# Patient Record
Sex: MALE | Race: WHITE | NOT HISPANIC OR LATINO | Employment: FULL TIME | URBAN - METROPOLITAN AREA
[De-identification: names, ages, dates, MRNs, and addresses within clinical notes are randomized per-mention and may not be internally consistent; named-entity substitution may affect disease eponyms.]

---

## 2017-07-15 ENCOUNTER — OFFICE VISIT (OUTPATIENT)
Dept: URGENT CARE | Facility: PHYSICIAN GROUP | Age: 52
End: 2017-07-15
Payer: COMMERCIAL

## 2017-07-15 VITALS
WEIGHT: 206 LBS | BODY MASS INDEX: 27.3 KG/M2 | TEMPERATURE: 99.8 F | RESPIRATION RATE: 16 BRPM | HEIGHT: 73 IN | SYSTOLIC BLOOD PRESSURE: 110 MMHG | DIASTOLIC BLOOD PRESSURE: 72 MMHG | OXYGEN SATURATION: 96 % | HEART RATE: 104 BPM

## 2017-07-15 DIAGNOSIS — R50.9 FEVER, UNSPECIFIED FEVER CAUSE: ICD-10-CM

## 2017-07-15 DIAGNOSIS — R11.0 NAUSEA: ICD-10-CM

## 2017-07-15 DIAGNOSIS — T14.8XXA BLISTER: ICD-10-CM

## 2017-07-15 DIAGNOSIS — B34.1 COXSACKIEVIRUS INFECTION: Primary | ICD-10-CM

## 2017-07-15 PROCEDURE — 99214 OFFICE O/P EST MOD 30 MIN: CPT | Performed by: PHYSICIAN ASSISTANT

## 2017-07-15 RX ORDER — ONDANSETRON 4 MG/1
4 TABLET, FILM COATED ORAL EVERY 4 HOURS PRN
Qty: 20 TAB | Refills: 0 | Status: SHIPPED | OUTPATIENT
Start: 2017-07-15 | End: 2017-11-30

## 2017-07-15 RX ORDER — SUMATRIPTAN 25 MG/1
25-100 TABLET, FILM COATED ORAL
COMMUNITY
End: 2017-11-30

## 2017-07-15 RX ORDER — TRIAMCINOLONE ACETONIDE 5 MG/G
OINTMENT TOPICAL
Qty: 30 G | Refills: 0 | Status: SHIPPED | OUTPATIENT
Start: 2017-07-15 | End: 2017-11-30

## 2017-07-15 ASSESSMENT — ENCOUNTER SYMPTOMS
HEADACHES: 1
CARDIOVASCULAR NEGATIVE: 1
MUSCULOSKELETAL NEGATIVE: 1
EYES NEGATIVE: 1
RESPIRATORY NEGATIVE: 1
PSYCHIATRIC NEGATIVE: 1
GASTROINTESTINAL NEGATIVE: 1
FEVER: 1
NEUROLOGICAL NEGATIVE: 1

## 2017-07-15 NOTE — PROGRESS NOTES
Subjective:      Frantz Parker is a 51 y.o. male who presents with Headache            Headache   Associated symptoms include a fever.     Chief Complaint   Patient presents with   • Headache     body aches, fever, blisters on hands X 4 days        HPI:  Frantz Parker is a 51 y.o. male who presents with blisters on palms, headache, body ache, and subjective fevers x 4 days.  Blisters started today.  Taking migraine medicine and not improving the headache.   Also trying the excedrin and iburpofen and sudafed.  HA is everywhere and improved slightly with imitrex.  Started in the frontal region.  Migraines normally happen 2 times a month.      Also feeling fever and tired.  Did have ingrown toe and took bactrim, off for 7 days.Patient denies HA, SOB, chest pain, palpitations, fever, chills, or n/v/d.      Past Medical History   Diagnosis Date   • Snoring    • Unspecified disorder of thyroid    • Sleep apnea      CPAP   • Hypothyroidism    • Obstructive sleep apnea        Past Surgical History   Procedure Laterality Date   • Other  2007     sinus surgery   • Inguinal hernia repair bilateral  6/6/2013     Performed by Mp Horan M.D. at SURGERY [facility]   • Lumbar laminectomy diskectomy  11/8/2013     Performed by Maycol Hicks M.D. at SURGERY [facility]   • Foraminotomy  11/8/2013     Performed by Maycol Hicks M.D. at SURGERY [facility]       No family history on file.    Social History     Social History   • Marital Status:      Spouse Name: N/A   • Number of Children: N/A   • Years of Education: N/A     Occupational History   • Not on file.     Social History Main Topics   • Smoking status: Former Smoker -- 0.50 packs/day for 15 years     Types: Cigarettes     Start date: 01/01/1994     Quit date: 01/01/2009   • Smokeless tobacco: Never Used   • Alcohol Use: No   • Drug Use: No   • Sexual Activity: Not on file     Other Topics Concern   • Not on file  "    Social History Narrative         Current outpatient prescriptions:   •  SUMAtriptan,  mg, Oral, Once PRN  •  rosuvastatin, 5 mg, Oral, DAILY  •  levothyroxine, 150 mcg, Oral, DAILY  •  azithromycin, z-jennifer; U.D., Not taking  •  amoxicillin-clavulanate, 1 Tab, Oral, BID, Not taking  •  guaifenesin-codeine, 5 mL, Oral, Q4HRS PRN, Not taking  •  diazepam, 5 mg, Oral, Q8HRS PRN, Not Taking  •  oxycodone-acetaminophen, 1-2 Tab, Oral, Q4HRS PRN, Not Taking  •  senna-docusate, 2 Tab, Oral, DAILY, Not Taking  •  Diazepam (VALIUM PO), 5 mg, Oral, PRN, Not Taking at Unknown  •  Rosuvastatin Calcium (CRESTOR PO), 5 mg, Oral, Q EVENING, Taking at Unknown  •  polyethylene glycol/lytes, 17 g, Oral, DAILY, Not Taking at Unknown  •  acetaminophen, 650 mg, Oral, Q4HRS PRN, Not Taking at Unknown    No Known Allergies         Review of Systems   Constitutional: Positive for fever.   Eyes: Negative.    Respiratory: Negative.    Cardiovascular: Negative.    Gastrointestinal: Negative.    Genitourinary: Negative.    Musculoskeletal: Negative.    Skin: Positive for rash.        Blisters on hands   Neurological: Negative.    Endo/Heme/Allergies: Negative.    Psychiatric/Behavioral: Negative.           Objective:     /72 mmHg  Pulse 104  Temp(Src) 37.7 °C (99.8 °F)  Resp 16  Ht 1.854 m (6' 1\")  Wt 93.441 kg (206 lb)  BMI 27.18 kg/m2  SpO2 96%     Physical Exam       Nursing note and vital signs reviewed.    Constitutional:  Appropriately groomed, pleasant affect, well nourished, and in no acute distress.    HEENT:  Head: Atraumatic, normocephalic.    Eyes:  EOMs full.  Conjunctivae clear, sclera white, and medial canthus without exudate bilaterally.    Ears:  Hearing grossly intact to voice.    Neck:  FROM.  No anterior cervical chain lymphadenopathy. Thyroid nonpalpable, without masses or nodules. No supraclavicular lymphadenopathy to palpation.    Throat:  Oropharynx slightly erythematous but no lesions present. No " target shaped lesions around the mouth. Dentition within normal limits. No enlargement of the tonsils bilaterally. Uvula midline.     Lungs:  Lungs with normal respiratory excursion and effort.      Muscle skeletal:  Gait and station wnl, non antalgic.    Derm:  Bilateral hands thumb web space: Lesions are superficial with erythema surrounding and clear fluid with a thin viviana and central darkening.    Overall good turgor pressure.     Psychiatric:  Normal judgement, mood and affect.        Assessment/Plan:     1. Fever, unspecified fever cause  ondansetron (ZOFRAN) 4 MG Tab tablet   2. Blister  triamcinolone (ARISTOCORT) 0.5 % ointment    ondansetron (ZOFRAN) 4 MG Tab tablet   3. Nausea  ondansetron (ZOFRAN) 4 MG Tab tablet   4. Coxsackievirus infection        Patient presents for suspected coxsackie virus infection with target lesions on the hands, thumb web spaces bilaterally. No significant palmar lesions, but will full lesions developing in clinic over the top of the hands and fingers. Not dyshidrotic eczema like. Lesions are superficial with erythema surrounding and clear fluid with a thin viviana and central darkening. No oral lesions or plantar lesions. Given URI symptoms with coryza with clear rhinorrhea and slightly erythematous oropharynx, fever, nausea and headache suspect coxsackievirus with likely exposure between July 4 and Saturday of that week. Recommended supportive measures provided patient with work note. Did recommend returning if symptoms change or worsen. Prescribe Zofran for nausea and topical steroid cream for supportive care only. Reviewed with Frantz and his wife etiology of coxsackievirus and expectation on resolution of symptoms to be within 4-10 days, possibly up to 2-3 weeks if rash worsens.    Patient was in agreement with this treatment plan and seemed to understand without barriers. All questions were encouraged and answered.  Reviewed signs and symptoms of when to seek emergency  medical care.     Please note that this dictation was created using voice recognition software.  I have made every reasonable attempt to correct obvious errors, but I expect there are errors of glo and possibly content that I did not discover before finalizing the note.

## 2017-07-15 NOTE — PATIENT INSTRUCTIONS
Hand, Foot, and Mouth Disease  Hand, foot, and mouth disease is an illness caused by a type of germ (virus). Most people are better in 1 week. It can spread easily (contagious). It can be spread through contact with an infected persons:  · Spit (saliva).  · Snot (nasal discharge).  · Poop (stool).  HOME CARE  · Feed your child healthy foods and drinks.  ¨ Avoid salty, spicy, or acidic foods or drinks.  ¨ Offer soft foods and cold drinks.  · Ask your doctor about replacing body fluid loss (rehydration).  · Avoid bottles for younger children if it causes pain. Use a cup, spoon, or syringe.  · Keep your child out of childcare, schools, or other group settings during the first few days of the illness, or until they are without fever.  GET HELP RIGHT AWAY IF:  · Your child has signs of body fluid loss (dehydration):  ¨ Peeing (urinating) less.  ¨ Dry mouth, tongue, or lips.  ¨ Decreased tears or sunken eyes.  ¨ Dry skin.  ¨ Fast breathing.  ¨ Fussy behavior.  ¨ Poor color or pale skin.  ¨ Fingertips take more than 2 seconds to turn pink again after a gentle squeeze.  ¨ Fast weight loss.  · Your child's pain does not get better.  · Your child has a severe headache, stiff neck, or has a change in behavior.  · Your child has sores (ulcers) or blisters on the lips or outside of the mouth.  MAKE SURE YOU:  · Understand these instructions.  · Will watch your child's condition.  · Will get help right away if your child is not doing well or gets worse.     This information is not intended to replace advice given to you by your health care provider. Make sure you discuss any questions you have with your health care provider.     Document Released: 08/30/2012 Document Revised: 03/11/2013 Document Reviewed: 01/25/2016  [employer] Interactive Patient Education ©2016 [employer]vier Inc.

## 2017-07-15 NOTE — Clinical Note
July 15, 2017         Patient: Frantz Parker   YOB: 1965   Date of Visit: 7/15/2017           To Whom it May Concern:    Frantz Parker was seen in my clinic on 7/15/2017. Please excuse him from work 7/17-7/19.  May return sooner if improving.    If you have any questions or concerns, please don't hesitate to call.        Sincerely,           Rivera Perez PA-C  Electronically Signed

## 2017-07-15 NOTE — MR AVS SNAPSHOT
"        Frantz Parker   7/15/2017 10:40 AM   Office Visit   MRN: [id number]    Department:  [facility]   Dept Phone:  [phone]    Description:  Male : 1965   Provider:  Rivera Perez PA-C           Reason for Visit     Headache body aches, fever, blisters on hands X 4 days       Allergies as of 7/15/2017     No Known Allergies      You were diagnosed with     Fever, unspecified fever cause   [1943597]       Blister   [[id number]]       Nausea   [[id number]]       Coxsackievirus infection   [[id number]]         Vital Signs     Blood Pressure Pulse Temperature Respirations Height Weight    110/72 mmHg 104 37.7 °C (99.8 °F) 16 1.854 m (6' 1\") 93.441 kg (206 lb)    Body Mass Index Oxygen Saturation Smoking Status             27.18 kg/m2 96% Former Smoker         Basic Information     Date Of Birth Sex Race Ethnicity Preferred Language    1965 Male White Non- English      Problem List              ICD-10-CM Priority Class Noted - Resolved    Bilateral inguinal hernia with obstruction K40.30   2013 - Present    Thoracic or lumbosacral neuritis or radiculitis, unspecified [id number]   2013 - Present      Health Maintenance        Date Due Completion Dates    IMM DTaP/Tdap/Td Vaccine (1 - Tdap) 1983 ---    COLONOSCOPY 2014 ---    IMM INFLUENZA (1) 2017 10/1/2015            Current Immunizations     Influenza LAIV (Nasal) 10/1/2015      Below and/or attached are the medications your provider expects you to take. Review all of your home medications and newly ordered medications with your provider and/or pharmacist. Follow medication instructions as directed by your provider and/or pharmacist. Please keep your medication list with you and share with your provider. Update the information when medications are discontinued, doses are changed, or new medications (including over-the-counter products) are added; and carry medication information at all times in the event of emergency " situations     Allergies:  No Known Allergies          Medications  Valid as of: July 15, 2017 - 12:09 PM    Generic Name Brand Name Tablet Size Instructions for use    Acetaminophen (Tab) TYLENOL 325 MG Take 650 mg by mouth every four hours as needed.        Amoxicillin-Pot Clavulanate (Tab) AUGMENTIN 875-125 MG Take 1 Tab by mouth 2 times a day.        Azithromycin (Tab) ZITHROMAX 250 MG z-jennifer; U.D.        DiazePAM   Take 5 mg by mouth as needed.        DiazePAM (Tab) VALIUM 5 MG Take 1 Tab by mouth every 8 hours as needed.        Guaifenesin-Codeine (Solution) ROBITUSSIN -10 mg/5mL Take 5 mL by mouth every four hours as needed for Cough.        Levothyroxine Sodium (Tab) SYNTHROID 150 MCG Take 150 mcg by mouth every day.        Ondansetron HCl (Tab) ZOFRAN 4 MG Take 1 Tab by mouth every four hours as needed for Nausea/Vomiting.        Oxycodone-Acetaminophen (Tab) PERCOCET 5-325 MG Take 1-2 Tabs by mouth every four hours as needed ((Pain Scale 4-6)).        Polyethylene Glycol 3350 (Pack) MIRALAX  Take 17 g by mouth every day.        Rosuvastatin Calcium   Take 5 mg by mouth every evening.        Rosuvastatin Calcium (Tab) CRESTOR 5 MG Take 5 mg by mouth every day.        Sennosides-Docusate Sodium (Tab) PERICOLACE or SENOKOT S 8.6-50 MG Take 2 Tabs by mouth every day.        SUMAtriptan Succinate (Tab) IMITREX 25 MG Take  mg by mouth Once PRN for Migraine.        Triamcinolone Acetonide (Ointment) ARISTOCORT 0.5 % Apply small amount to rash BID for 1 week, wash hands after use        .                 Medicines prescribed today were sent to:     [id number] - [place] - [address] [facility] [employer] [address] [facility]    [address] [address]    Phone: [phone] Fax: [phone]    Open 24 Hours?: No      Medication refill instructions:       If your prescription bottle indicates you have medication refills left, it is not necessary to call your provider’s office. Please  contact your pharmacy and they will refill your medication.    If your prescription bottle indicates you do not have any refills left, you may request refills at any time through one of the following ways: The online [employer] technology system (except Urgent Care), by calling your provider’s office, or by asking your pharmacy to contact your provider’s office with a refill request. Medication refills are processed only during regular business hours and may not be available until the next business day. Your provider may request additional information or to have a follow-up visit with you prior to refilling your medication.   *Please Note: Medication refills are assigned a new Rx number when refilled electronically. Your pharmacy may indicate that no refills were authorized even though a new prescription for the same medication is available at the pharmacy. Please request the medicine by name with the pharmacy before contacting your provider for a refill.        Instructions    Hand, Foot, and Mouth Disease  Hand, foot, and mouth disease is an illness caused by a type of germ (virus). Most people are better in 1 week. It can spread easily (contagious). It can be spread through contact with an infected persons:  · Spit (saliva).  · Snot (nasal discharge).  · Poop (stool).  HOME CARE  · Feed your child healthy foods and drinks.  ¨ Avoid salty, spicy, or acidic foods or drinks.  ¨ Offer soft foods and cold drinks.  · Ask your doctor about replacing body fluid loss (rehydration).  · Avoid bottles for younger children if it causes pain. Use a cup, spoon, or syringe.  · Keep your child out of childcare, schools, or other group settings during the first few days of the illness, or until they are without fever.  GET HELP RIGHT AWAY IF:  · Your child has signs of body fluid loss (dehydration):  ¨ Peeing (urinating) less.  ¨ Dry mouth, tongue, or lips.  ¨ Decreased tears or sunken eyes.  ¨ Dry skin.  ¨ Fast breathing.  ¨ Fussy  behavior.  ¨ Poor color or pale skin.  ¨ Fingertips take more than 2 seconds to turn pink again after a gentle squeeze.  ¨ Fast weight loss.  · Your child's pain does not get better.  · Your child has a severe headache, stiff neck, or has a change in behavior.  · Your child has sores (ulcers) or blisters on the lips or outside of the mouth.  MAKE SURE YOU:  · Understand these instructions.  · Will watch your child's condition.  · Will get help right away if your child is not doing well or gets worse.     This information is not intended to replace advice given to you by your health care provider. Make sure you discuss any questions you have with your health care provider.     Document Released: 08/30/2012 Document Revised: 03/11/2013 Document Reviewed: 01/25/2016  [employer] Interactive Patient Education ©2016 Elsevier Inc.              VIOlife Access Code: [email or web address]  Expires: 8/14/2017 12:09 PM    VIOlife  A secure, online tool to manage your health information     [email or web address] VIOlife® is a secure, online tool that connects you to your personalized health information from the privacy of your home -- day or night - making it very easy for you to manage your healthcare. Once the activation process is completed, you can even access your medical information using the VIOlife nisha, which is available for free in the Apple Nisha store or Google Play store.     VIOlife provides the following levels of access (as shown below):   My Chart Features   [facility]  Specialists [facility]  Urgent  Care Non-[facility]  Primary Care  Doctor   Email your healthcare team securely and privately 24/7 X X X    Manage appointments: schedule your next appointment; view details of past/upcoming appointments X      Request prescription refills. X      View recent personal medical records, including lab and immunizations X X X X   View health record, including health history, allergies, medications X X X X   Read  reports about your outpatient visits, procedures, consult and ER notes X X X X   See your discharge summary, which is a recap of your hospital and/or ER visit that includes your diagnosis, lab results, and care plan. X X       How to register for expressor software:  1. Go to  [email or web address].  2. Click on the Sign Up Now box, which takes you to the New Member Sign Up page. You will need to provide the following information:  a. Enter your expressor software Access Code exactly as it appears at the top of this page. (You will not need to use this code after you’ve completed the sign-up process. If you do not sign up before the expiration date, you must request a new code.)   b. Enter your date of birth.   c. Enter your home email address.   d. Click Submit, and follow the next screen’s instructions.  3. Create a expressor software ID. This will be your expressor software login ID and cannot be changed, so think of one that is secure and easy to remember.  4. Create a expressor software password. You can change your password at any time.  5. Enter your Password Reset Question and Answer. This can be used at a later time if you forget your password.   6. Enter your e-mail address. This allows you to receive e-mail notifications when new information is available in expressor software.  7. Click Sign Up. You can now view your health information.    For assistance activating your expressor software account, call [phone]

## 2017-07-27 ENCOUNTER — HOSPITAL ENCOUNTER (OUTPATIENT)
Dept: HOSPITAL 8 - CFH | Age: 52
Discharge: HOME | End: 2017-07-27
Attending: NURSE PRACTITIONER
Payer: COMMERCIAL

## 2017-07-27 DIAGNOSIS — G89.29: ICD-10-CM

## 2017-07-27 DIAGNOSIS — E78.2: ICD-10-CM

## 2017-07-27 DIAGNOSIS — I67.82: Primary | ICD-10-CM

## 2017-07-27 DIAGNOSIS — E03.9: ICD-10-CM

## 2017-07-27 DIAGNOSIS — E55.9: ICD-10-CM

## 2017-07-27 PROCEDURE — 70551 MRI BRAIN STEM W/O DYE: CPT

## 2017-11-30 ENCOUNTER — OCCUPATIONAL MEDICINE (OUTPATIENT)
Dept: URGENT CARE | Facility: PHYSICIAN GROUP | Age: 52
End: 2017-11-30
Payer: COMMERCIAL

## 2017-11-30 VITALS
SYSTOLIC BLOOD PRESSURE: 120 MMHG | WEIGHT: 205 LBS | BODY MASS INDEX: 27.17 KG/M2 | TEMPERATURE: 97.6 F | HEART RATE: 64 BPM | RESPIRATION RATE: 16 BRPM | DIASTOLIC BLOOD PRESSURE: 72 MMHG | HEIGHT: 73 IN | OXYGEN SATURATION: 97 %

## 2017-11-30 DIAGNOSIS — S76.212A STRAIN OF GROIN, LEFT, INITIAL ENCOUNTER: ICD-10-CM

## 2017-11-30 PROCEDURE — 99213 OFFICE O/P EST LOW 20 MIN: CPT | Mod: 29 | Performed by: PHYSICIAN ASSISTANT

## 2017-11-30 ASSESSMENT — ENCOUNTER SYMPTOMS
CONSTITUTIONAL NEGATIVE: 1
ROS GI COMMENTS: SEE HPI
BRUISES/BLEEDS EASILY: 0
NEUROLOGICAL NEGATIVE: 1

## 2017-11-30 NOTE — PROGRESS NOTES
Subjective:      Frantz Parker is a 52 y.o. male who presents with Hernia (groin area, WC)      DOI: 11/28/17, 1730.  Left groin area.  Patient was pulling out some old pipe, down in trench, causing him to be in a straddled position.  Patient states he pulling up to break the pipes loose and felt a moment of pull/strain in his left groin.  Patient states he felt the pain throughout the day yesterday and the day of injury.    He states he a little bit of pain in the testicular/perineum region.  He has not noticed a physical lump or bulges anywhere. No changes in bowels, no nausea or vomiting.   Patient has hx of bilateral inguinal hernia repair approx 4 years ago without continued pain or issue.  He has not attempted any interventions.   He notes the pain is getting better today.      HPI   As above.     Review of Systems   Constitutional: Negative.    Gastrointestinal:        SEE HPI   Musculoskeletal:        SEE HPI   Skin: Negative.    Neurological: Negative.    Endo/Heme/Allergies: Does not bruise/bleed easily.       PMH:  has a past medical history of Hypothyroidism; Obstructive sleep apnea; Sleep apnea; Snoring; and Unspecified disorder of thyroid.  MEDS:   Current Outpatient Prescriptions:   •  rosuvastatin (CRESTOR) 5 MG Tab, Take 5 mg by mouth every day., Disp: , Rfl:   •  levothyroxine (SYNTHROID) 150 MCG TABS, Take 150 mcg by mouth every day., Disp: , Rfl:   ALLERGIES: No Known Allergies  SURGHX:   Past Surgical History:   Procedure Laterality Date   • LUMBAR LAMINECTOMY DISKECTOMY  11/8/2013    Performed by Maycol Hicks M.D. at SURGERY [facility]   • FORAMINOTOMY  11/8/2013    Performed by Maycol Hicks M.D. at SURGERY [facility]   • INGUINAL HERNIA REPAIR BILATERAL  6/6/2013    Performed by Mp Horan M.D. at SURGERY [facility]   • OTHER  2007    sinus surgery     SOCHX:  reports that he quit smoking about 8 years ago. His smoking use included Cigarettes. He  "started smoking about 23 years ago. He has a 7.50 pack-year smoking history. He has never used smokeless tobacco. He reports that he does not drink alcohol or use drugs.  FH: Family history was reviewed, no pertinent findings to report     Objective:     /72   Pulse 64   Temp 36.4 °C (97.6 °F)   Resp 16   Ht 1.854 m (6' 1\")   Wt 93 kg (205 lb)   SpO2 97%   BMI 27.05 kg/m²      Physical Exam   Constitutional: He is oriented to person, place, and time. He appears well-developed and well-nourished. No distress.   Cardiovascular: Normal rate and regular rhythm.    Pulmonary/Chest: Effort normal and breath sounds normal.   Neurological: He is alert and oriented to person, place, and time.   Skin: Skin is warm and dry.   Psychiatric: He has a normal mood and affect. His behavior is normal.     Vitals reviewed  Groin/Abdominal exam:  Abdomen: Soft, nontender, nondistended. Normal bowel sounds. No hepatosplenomegaly or masses, or hernias. No rebound or guarding.  Groin: No testicular swelling or erythema.  No masses or tenderness to palpation.  There is no palpation or detection of direct or indirect inguinal hernia.  No inguinal adenopathy.   Left hip:  FROM without pain including adduction with resistance.         Assessment/Plan:     1. Strain of groin, left, initial encounter         Patient with benign abdominal and groin exam today.  Mild groin tenderness near adductor tendon insertion, no hernias detected.  Consistent with groin strain  Recommend Ibuprofen 600 mg up to TID prn. With food.   Restrictions from heavy lifting as above, 4 day follow up for recheck.          Shyla Anaya P.A.-C.      "

## 2017-11-30 NOTE — LETTER
"EMPLOYEE’S CLAIM FOR COMPENSATION/ REPORT OF INITIAL TREATMENT  FORM C-4    EMPLOYEE’S CLAIM - PROVIDE ALL INFORMATION REQUESTED   First Name  Frantz Last Name  Keith Birthdate                    1965                Sex  male Claim Number   Home Address  [address] Age  52 y.o. Height  1.854 m (6' 1\") Weight  93 kg (205 lb) [facility]     [facility] Zip  [address] Telephone  [phone] (home)    Mailing Address  [address] [facility] Zip  [address] Primary Language Spoken  English    Insurer   Third Party   Troy Hull   Employee's Occupation (Job Title) When Injury or Occupational Disease Occurred      Employer's Name  [employer]  Telephone  [phone]    Employer Address  [address]  [address]    Date of Injury  11/28/2017               Hour of Injury  8:30 AM Date Employer Notified  11/28/2017 Last Day of Work after Injury or Occupational Disease  11/30/2017 Supervisor to Whom Injury Reported  Rajesh Sahni   Address or Location of Accident (if applicable)  [[address] [place] [address].]   What were you doing at the time of accident? (if applicable)  Plumping    How did this injury or occupational disease occur? (Be specific an answer in detail. Use additional sheet if necessary)  Trying to breake loose some old pipes   If you believe that you have an occupational disease, when did you first have knowledge of the disability and it relationship to your employment?  no Witnesses to the Accident  Danny      Nature of Injury or Occupational Disease  Strain  Part(s) of Body Injured or Affected  Abdomen Including Groin, ,     I certify that the above is true and correct to the best of my knowledge and that I have provided this information in order to obtain the benefits of [place] " [place] or [place]).  I hereby authorize any physician, chiropractor, surgeon, practitioner, or other person, any hospital, including [facility], any medical service organization, any insurance company, or other institution or organization to release to each other, any medical or other information, including benefits paid or payable, pertinent to this injury or disease, except information relative to diagnosis, treatment and/or counseling for AIDS, psychological conditions, alcohol or controlled substances, for which I must give specific authorization.  A Photostat of this authorization shall be as valid as the original.     Date   Place   Employee’s Signature   THIS REPORT MUST BE COMPLETED AND MAILED WITHIN 3 WORKING DAYS OF TREATMENT   Place  [facility]  Name of Facility  [place]   Date  11/30/2017 Diagnosis  (S76.212A) Strain of groin, left, initial encounter Is there evidence the injured employee was under the influence of alcohol and/or another controlled substance at the time of accident?   Hour  9:38 AM Description of Injury or Disease  The encounter diagnosis was Strain of groin, left, initial encounter. No   Treatment  Patient with benign abdomina and groin exam today.  Mild groin tenderness near adductor tendon insertion, no hernias detected.  Consistent with groin strain  Recommend Ibuprofen 600 mg up to TID prn. With food.   Restrictions from heavy lifting as above, 4 day follow up for recheck.      Have you advised the patient to remain off work five days or more? No   X-Ray Findings      If Yes   From Date  To Date      From information given by the employee, together with medical evidence, can you directly connect this injury or occupational disease as job incurred?  Yes If No Full Duty  No Modified Duty  Yes   Is additional medical care by a physician indicated?  Yes If Modified Duty, Specify any Limitations  "/ Restrictions  SEE D-39   Do you know of any previous injury or disease contributing to this condition or occupational disease?                            Yes  Comments:Patient has had inguinal hernia repair in the past, no evidence of recurrence of hernia however.    Date  11/30/2017 Print Doctor’s Name Alexis Anaya P.A.-C. I certify the employer’s copy of  this form was mailed on:   Address  [address] [place] [address]. [address] Insurer’s Use Only     [facility] Zip  [address]    Provider’s Tax ID Number  [id number] Telephone  Dept: [phone]        e-ALEXIS Santiago P.A.-C.   e-Signature: Dr. Francisco J Taylor, Medical Director Degree  LG        ORIGINAL-TREATING PHYSICIAN OR CHIROPRACTOR    PAGE 2-INSURER/TPA    PAGE 3-EMPLOYER    PAGE 4-EMPLOYEE             Form C-4 (rev10/07)              BRIEF DESCRIPTION OF RIGHTS AND BENEFITS  (Pursuant to [place] 616C.050)    Notice of Injury or Occupational Disease (Incident Report Form C-1): If an injury or occupational disease (OD) arises out of and in the  course of employment, you must provide written notice to your employer as soon as practicable, but no later than 7 days after the accident or  OD. Your employer shall maintain a sufficient supply of the required forms.    Claim for Compensation (Form C-4): If medical treatment is sought, the form C-4 is available at the place of initial treatment. A completed  \"Claim for Compensation\" (Form C-4) must be filed within 90 days after an accident or OD. The treating physician or chiropractor must,  within 3 working days after treatment, complete and mail to the employer, the employer's insurer and third-party , the Claim for  Compensation.    Medical Treatment: If you require medical treatment for your on-the-job injury or OD, you may be required to select a physician or  chiropractor from a list provided by your workers’ compensation insurer, if it has contracted with an Organization for " Managed Care (MCO) or  Preferred Provider Organization (PPO) or providers of health care. If your employer has not entered into a contract with an MCO or PPO, you  may select a physician or chiropractor from the Panel of Physicians and Chiropractors. Any medical costs related to your industrial injury or  OD will be paid by your insurer.    Temporary Total Disability (TTD): If your doctor has certified that you are unable to work for a period of at least 5 consecutive days, or 5  cumulative days in a 20-day period, or places restrictions on you that your employer does not accommodate, you may be entitled to TTD  compensation.    Temporary Partial Disability (TPD): If the wage you receive upon reemployment is less than the compensation for TTD to which you are  entitled, the insurer may be required to pay you TPD compensation to make up the difference. TPD can only be paid for a maximum of 24  months.    Permanent Partial Disability (PPD): When your medical condition is stable and there is an indication of a PPD as a result of your injury or  OD, within 30 days, your insurer must arrange for an evaluation by a rating physician or chiropractor to determine the degree of your PPD. The  amount of your PPD award depends on the date of injury, the results of the PPD evaluation and your age and wage.    Permanent Total Disability (PTD): If you are medically certified by a treating physician or chiropractor as permanently and totally disabled  and have been granted a PTD status by your insurer, you are entitled to receive monthly benefits not to exceed 66 2/3% of your average  monthly wage. The amount of your PTD payments is subject to reduction if you previously received a PPD award.    Vocational Rehabilitation Services: You may be eligible for vocational rehabilitation services if you are unable to return to the job due to a  permanent physical impairment or permanent restrictions as a result of your injury or  occupational disease.    Transportation and Per Aj Reimbursement: You may be eligible for travel expenses and per aj associated with medical treatment.    Reopening: You may be able to reopen your claim if your condition worsens after claim closure.    Appeal Process: If you disagree with a written determination issued by the insurer or the insurer does not respond to your request, you may  appeal to the Department of Administration, , by following the instructions contained in your determination letter. You must  appeal the determination within 70 days from the date of the determination letter at [address], [address], [place], [place]  [address], or [address], [address], [place], [place] [address]. If you disagree with the  decision, you may appeal to the  Department of Administration, . You must file your appeal within 30 days from the date of the  decision  letter at [address], [address], [place], [place] [address], or [address], [place], [place] [address]. If you  disagree with a decision of an , you may file a petition for judicial review with the District Court. You must do so within 30  days of the Appeal Officer’s decision. You may be represented by an  at your own expense or you may contact the [facility] for possible  representation.    [place]  for Injured Workers (NAIW): If you disagree with a  decision, you may request that NAIW represent you  without charge at an  Hearing. For information regarding denial of benefits, you may contact the [facility] at: [address], [address] [address], [phone], or [address] [facility] [address], [place] [address], [phone]    To File a Complaint with the Division: If you wish to file a complaint with the  of the Division of Industrial Relations  (DIR),  please contact the Workers’ Compensation Section, [address], [address], [place], [place] [address], telephone (390) [phone], or  [address], [address], [place], [place] [address], telephone [phone].    For assistance with Workers’ Compensation Issues: you may contact the Office of the [facility] Consumer Health Assistance, [address] [place] [address], [address], [place], [place] [address], Toll Free 1-584.952.1515, Web site: http://govcha.[facility].nv., E-mail  [email or web address].[email or web address].                                                                                                                                                                                                                                   __________________________________________________________________                                                                   _________________                Employee Name / Signature                                                                                                                                                       Date                                                                                                                                                                                                     D-2 (rev. 10/07)

## 2017-11-30 NOTE — LETTER
[facility]  [address] [place] [address]. [address] - [place] [address]  Phone:  [phone] - Fax:  [phone]   [facility] Progress Report and Disability Certification  Date of Service: 11/30/2017   No Show:  No  Date / Time of Next Visit: 12/4/2017 at 4:30PM [facility]   Claim Information   Patient Name: Frantz Parker  Claim Number:     Employer: [employer]  Date of Injury: 11/28/2017     Insurer / TPA: Troy Hull  ID / SSN:     Occupation:   Diagnosis: The encounter diagnosis was Strain of groin, left, initial encounter.    Medical Information   Related to Industrial Injury? Yes    Subjective Complaints:  DOI: 11/28/17, 4847.  Left groin area.  Patient was pulling out some old pipe, down in trench, causing him to be in a straddled position.  Patient states he pulling up to break the pipes loose and felt a moment of pull/strain in his left groin.  Patient states he felt the pain throughout the day yesterday and the day of injury.    He states he a little bit of pain in the testicular/perineum region.  He has not noticed a physical lump or bulges anywhere. No changes in bowels, no nausea or vomiting.   Patient has hx of bilateral inguinal hernia repair approx 4 years ago without continued pain or issue.  He has not attempted any interventions.   He notes the pain is getting better today.    Objective Findings: Vitals reviewed  Groin/Abdominal exam:  Abdomen: Soft, nontender, nondistended. Normal bowel sounds. No hepatosplenomegaly or masses, or hernias. No rebound or guarding.  Groin: No testicular swelling or erythema.  No masses or tenderness to palpation.  There is no palpation or detection of direct or indirect inguinal hernia.  No inguinal adenopathy.   Left hip:  FROM without pain including adduction with resistance.     Pre-Existing Condition(s): Bilateral inguinal hernia, requiring surgery approx 4 years ago.    Assessment:    Initial Visit    Status: Additional Care Required  Permanent Disability:No    Plan:      Diagnostics:      Comments:       Disability Information   Status: Released to Restricted Duty    From:  11/30/2017  Through: 12/4/2017 Restrictions are: Temporary   Physical Restrictions   Sitting:    Standing:    Stooping:    Bending:      Squatting:    Walking:    Climbing:    Pushing:      Pulling:    Other:    Reaching Above Shoulder (L):   Reaching Above Shoulder (R):       Reaching Below Shoulder (L):    Reaching Below Shoulder (R):      Not to exceed Weight Limits   Carrying(hrs):   Weight Limit(lb): < or = to 25 pounds Lifting(hrs):   Weight  Limit(lb): < or = to 25 pounds   Comments: Patient with benign abdomina and groin exam today.  Mild groin tenderness near adductor tendon insertion, no hernias detected.  Consistent with groin strain  Recommend Ibuprofen 600 mg up to TID prn. With food.   Restrictions from heavy lifting as above, 4 day follow up for recheck.        Repetitive Actions   Hands: i.e. Fine Manipulations from Grasping:     Feet: i.e. Operating Foot Controls:     Driving / Operate Machinery:     Physician Name: Alexis Anaya P.A.-C. Physician Signature: ALEXIS Murrieta P.A.-C. e-Signature: Dr. Francisco J Taylor, Medical Director   Clinic Name / Location: [address]  [place] [address] Clinic Phone Number: Dept: [phone]   Appointment Time: 9:10 Am Visit Start Time: 9:38 AM   Check-In Time:  9:24 Am Visit Discharge Time:  10:20pm   Original-Treating Physician or Chiropractor    Page 2-Insurer/TPA    Page 3-Employer    Page 4-Employee

## 2017-12-04 ENCOUNTER — OCCUPATIONAL MEDICINE (OUTPATIENT)
Dept: URGENT CARE | Facility: PHYSICIAN GROUP | Age: 52
End: 2017-12-04
Payer: COMMERCIAL

## 2017-12-04 VITALS
SYSTOLIC BLOOD PRESSURE: 122 MMHG | HEART RATE: 66 BPM | TEMPERATURE: 98.9 F | HEIGHT: 73 IN | WEIGHT: 205 LBS | BODY MASS INDEX: 27.17 KG/M2 | OXYGEN SATURATION: 99 % | RESPIRATION RATE: 14 BRPM | DIASTOLIC BLOOD PRESSURE: 72 MMHG

## 2017-12-04 DIAGNOSIS — S76.212D STRAIN OF GROIN, LEFT, SUBSEQUENT ENCOUNTER: ICD-10-CM

## 2017-12-04 PROCEDURE — 99213 OFFICE O/P EST LOW 20 MIN: CPT | Mod: 29 | Performed by: NURSE PRACTITIONER

## 2017-12-04 ASSESSMENT — ENCOUNTER SYMPTOMS
ABDOMINAL PAIN: 0
VOMITING: 0
FEVER: 0
MYALGIAS: 0
BRUISES/BLEEDS EASILY: 0
WEAKNESS: 0
CHILLS: 0
NAUSEA: 0

## 2017-12-04 NOTE — LETTER
[facility]  [address] [place] [address]. [address] - [place] [address]  Phone:  [phone] - Fax:  [phone]   [facility] Progress Report and Disability Certification  Date of Service: 12/4/2017   No Show:  No  Date / Time of Next Visit: 12/8/2017 @ 9:30AM   Claim Information   Patient Name: Frantz Parker  Claim Number:     Employer: [employer]  Date of Injury: 11/28/2017     Insurer / TPA: Troy Hull  ID / SSN:     Occupation:   Diagnosis: The encounter diagnosis was Strain of groin, left, subsequent encounter.    Medical Information   Related to Industrial Injury? Yes    Subjective Complaints:  DOI 11/28/17. 2nd encounter. Denies pain in left groin area, states occasional dull ache. Denies swelling, redness or lumps felt or seen. Denies problems, dysuria or hematuria on urination. No starining to have bowel movements. Taking Ibuprofen 600 mg yesterday and today on recommendation but not for pain.    Objective Findings: A/O x 4. No TTP on abdomen/left groin region, no abnormal gait due to discomfort.    Pre-Existing Condition(s):     Assessment:   Condition Improved    Status: Additional Care Required  Permanent Disability:No    Plan:      Diagnostics:      Comments:       Disability Information   Status: Released to Full Duty    From:  12/4/2017  Through: 12/8/2017 Restrictions are:     Physical Restrictions   Sitting:    Standing:    Stooping:    Bending:      Squatting:    Walking:    Climbing:    Pushing:      Pulling:    Other:    Reaching Above Shoulder (L):   Reaching Above Shoulder (R):       Reaching Below Shoulder (L):    Reaching Below Shoulder (R):      Not to exceed Weight Limits   Carrying(hrs):   Weight Limit(lb):   Lifting(hrs):   Weight  Limit(lb):     Comments: Full duty with no restriction trial x 4 days. Possible discharge/MMI next visit. May continue Ibuprofen as needed for pain/discomfort. Discontinue lifting > 25  pounds if invokes pain/discomfort and recheck before next visit as needed.    Repetitive Actions   Hands: i.e. Fine Manipulations from Grasping:     Feet: i.e. Operating Foot Controls:     Driving / Operate Machinery:     Physician Name: ALEXANDREA Carias Physician Signature: EMMANUEL Nye e-Signature: Dr. Francisco J Taylor, Medical Director   Clinic Name / Location: [address]  [place] [address] Clinic Phone Number: Dept: [phone]   Appointment Time: 4:30 Pm Visit Start Time: 4:09 PM   Check-In Time:  4:02 Pm Visit Discharge Time:  4:58 PM   Original-Treating Physician or Chiropractor    Page 2-Insurer/TPA    Page 3-Employer    Page 4-Employee

## 2017-12-05 NOTE — PROGRESS NOTES
Subjective:      Frantz Parker is a 52 y.o. male who presents with Follow-Up (Workman's comp Groin Strain (left) getting better. )      DOI 11/28/17. 2nd encounter. Denies pain in left groin area, states occasional dull ache. Denies swelling, redness or lumps felt or seen. Denies problems, dysuria or hematuria on urination. No starining to have bowel movements. Taking Ibuprofen 600 mg yesterday and today on recommendation but not for pain.      HPI  See above  PMH:  has a past medical history of Hypothyroidism; Obstructive sleep apnea; Sleep apnea; Snoring; and Unspecified disorder of thyroid.  MEDS:   Current Outpatient Prescriptions:   •  rosuvastatin (CRESTOR) 5 MG Tab, Take 5 mg by mouth every day., Disp: , Rfl:   •  levothyroxine (SYNTHROID) 150 MCG TABS, Take 150 mcg by mouth every day., Disp: , Rfl:   ALLERGIES: No Known Allergies  SURGHX:   Past Surgical History:   Procedure Laterality Date   • LUMBAR LAMINECTOMY DISKECTOMY  11/8/2013    Performed by Maycol Hicks M.D. at SURGERY [facility]   • FORAMINOTOMY  11/8/2013    Performed by Maycol Hicks M.D. at SURGERY [facility]   • INGUINAL HERNIA REPAIR BILATERAL  6/6/2013    Performed by Mp Horan M.D. at SURGERY [facility]   • OTHER  2007    sinus surgery     SOCHX:  reports that he quit smoking about 8 years ago. His smoking use included Cigarettes. He started smoking about 23 years ago. He has a 7.50 pack-year smoking history. He has never used smokeless tobacco. He reports that he does not drink alcohol or use drugs.  FH: Family history was reviewed, no pertinent findings to report    Review of Systems   Constitutional: Negative for chills, fever and malaise/fatigue.   Gastrointestinal: Negative for abdominal pain, nausea and vomiting.   Genitourinary: Negative for dysuria, frequency, hematuria and urgency.   Musculoskeletal: Negative for myalgias.   Neurological: Negative for weakness.   Endo/Heme/Allergies:  "Does not bruise/bleed easily.   All other systems reviewed and are negative.         Objective:     /72   Pulse 66   Temp 37.2 °C (98.9 °F)   Resp 14   Ht 1.854 m (6' 1\")   Wt 93 kg (205 lb)   SpO2 99%   BMI 27.05 kg/m²      Physical Exam   Constitutional: He is oriented to person, place, and time. He appears well-developed and well-nourished. No distress.   HENT:   Head: Normocephalic.   Eyes: Conjunctivae and EOM are normal. Pupils are equal, round, and reactive to light.   Cardiovascular: Normal rate.    Pulmonary/Chest: Effort normal.   Abdominal: Soft. Bowel sounds are normal. He exhibits no distension. There is no tenderness. There is no rebound and no guarding.   Musculoskeletal: Normal range of motion.   Neurological: He is alert and oriented to person, place, and time.   Skin: Skin is warm and dry. He is not diaphoretic.   Vitals reviewed.      A/O x 4. No TTP on abdomen/left groin region, no abnormal gait due to discomfort.        Assessment/Plan:     1. Strain of groin, left, subsequent encounter    Follow up in 4 days after return for full duties without restrictions trial, possible discharge/MMI at next visit      "

## 2017-12-08 ENCOUNTER — OCCUPATIONAL MEDICINE (OUTPATIENT)
Dept: URGENT CARE | Facility: PHYSICIAN GROUP | Age: 52
End: 2017-12-08
Payer: COMMERCIAL

## 2017-12-08 VITALS
RESPIRATION RATE: 16 BRPM | WEIGHT: 204 LBS | HEIGHT: 73 IN | HEART RATE: 85 BPM | BODY MASS INDEX: 27.04 KG/M2 | DIASTOLIC BLOOD PRESSURE: 72 MMHG | OXYGEN SATURATION: 94 % | TEMPERATURE: 99.7 F | SYSTOLIC BLOOD PRESSURE: 108 MMHG

## 2017-12-08 DIAGNOSIS — S76.212D STRAIN OF GROIN, LEFT, SUBSEQUENT ENCOUNTER: ICD-10-CM

## 2017-12-08 PROCEDURE — 99214 OFFICE O/P EST MOD 30 MIN: CPT | Mod: 29 | Performed by: PHYSICIAN ASSISTANT

## 2017-12-08 ASSESSMENT — ENCOUNTER SYMPTOMS: CONSTIPATION: 0

## 2017-12-08 NOTE — PROGRESS NOTES
"Subjective:      Frantz Parker is a 52 y.o. male who presents with Follow-Up (WC F/V DOI11/28/2017 LT groin.  PT states it feels a little better, but still has some pressure/discomfort.  Standing/walking no issues, feels like it pinches when he sits down.)    Pt PMH, SocHx, SurgHx, FamHx, Drug allergies and medications reviewed with pt/EPIC.      Family history reviewed, it is not pertinent to this complaint.     DOI: 11/28/2017.  PT here for follow up of groin strain/pull while at work.  Pt states he still feels pressure and sometimes pinching in left lower abdominal wall that sometime radiates into his left testicle.  PT does not characterize this as pain, but states he is \"aware of it\".  PT denies changes in bowel or bladder habits.       Work comp follow up for groin injury.         Review of Systems   Gastrointestinal: Negative for constipation.   Genitourinary: Negative.    All other systems reviewed and are negative.         Objective:     /72   Pulse 85   Temp 37.6 °C (99.7 °F)   Resp 16   Ht 1.854 m (6' 1\")   Wt 92.5 kg (204 lb)   SpO2 94%   BMI 26.91 kg/m²      Physical Exam   Constitutional: He is oriented to person, place, and time. He appears well-developed and well-nourished. No distress.   HENT:   Head: Normocephalic and atraumatic.   Nose: Nose normal.   Mouth/Throat: Oropharynx is clear and moist.   Eyes: Conjunctivae and EOM are normal. Pupils are equal, round, and reactive to light.   Neck: Normal range of motion. Neck supple. No JVD present.   Cardiovascular: Normal rate, regular rhythm and normal heart sounds.    Pulmonary/Chest: Effort normal and breath sounds normal.   Abdominal: Soft.       This is where pt perceives pressure and pinching.  No bulge or obvious abdominal wall abnormality.    Musculoskeletal: Normal range of motion.   Lymphadenopathy:     He has no cervical adenopathy.   Neurological: He is alert and oriented to person, place, and time.   Skin: Skin is warm " and dry. Capillary refill takes less than 2 seconds.       PT denies pain on palpation, but states he feels pressure.  Pt feels same pressure with flexion at left hip.  No bulge or deformity to abdominal wall on palpation.         Assessment/Plan:     1. Strain of groin, left, subsequent encounter       PT to continue work without restrictions at his request.  He wants to monitor his symptoms a little while longer before being released.  He is unsure if it is simply healing or if it is getting worse because he was taken off restrictions.      PT will follow up at [facility] for his next appointment.     PT reminded to be cautious with lifting, squatting, or carrying heavy objects.

## 2017-12-08 NOTE — LETTER
"   [facility]  [address] [place] [address]. [address] - [place] [address]  Phone:  [phone] - Fax:  [phone]   [facility] Progress Report and Disability Certification  Date of Service: 12/8/2017   No Show:  No  Date / Time of Next Visit: 12/13/2017@1:05 PM   Claim Information   Patient Name: Frantz Parker  Claim Number:     Employer: [employer]  Date of Injury: 11/28/2017     Insurer / TPA: Troy Hull  ID / SSN:     Occupation:   Diagnosis: The encounter diagnosis was Strain of groin, left, subsequent encounter.    Medical Information   Related to Industrial Injury? Yes    Subjective Complaints:  DOI: 11/28/2017.  PT here for follow up of groin strain/pull while at work.  Pt states he still feels pressure and sometimes pinching in left lower abdominal wall that sometime radiates into his left testicle.  PT does not characterize this as pain, but states he is \"aware of it\".  PT denies changes in bowel or bladder habits.     Objective Findings: PT denies pain on palpation, but states he feels pressure.  Pt feels same pressure with flexion at left hip.  No bulge or deformity to abdominal wall on palpation.     Pre-Existing Condition(s):     Assessment:   Condition Same    Status: Additional Care Required  Permanent Disability:No    Plan:      Diagnostics:      Comments:       Disability Information   Status: Released to Full Duty    From:  12/8/2017  Through: 12/13/2017 Restrictions are:     Physical Restrictions   Sitting:    Standing:    Stooping:    Bending:      Squatting:    Walking:    Climbing:    Pushing:      Pulling:    Other:    Reaching Above Shoulder (L):   Reaching Above Shoulder (R):       Reaching Below Shoulder (L):    Reaching Below Shoulder (R):      Not to exceed Weight Limits   Carrying(hrs):   Weight Limit(lb):   Lifting(hrs):   Weight  Limit(lb):     Comments: PT would like to continue without restrictions to see if " symptoms remain stable or worsen.  Pt will follow up with [facility] for next visit.     Repetitive Actions   Hands: i.e. Fine Manipulations from Grasping:     Feet: i.e. Operating Foot Controls:     Driving / Operate Machinery:     Physician Name: Kathrin Wilson P.A.-C. Physician Signature: KATHRIN Rankin P.A.-C. e-Signature: Dr. Francisco J Taylor, Medical Director   Clinic Name / Location: [address]  [place] [address] Clinic Phone Number: Dept: [phone]   Appointment Time: 9:30 Am Visit Start Time: 9:22 AM   Check-In Time:  9:18 Am Visit Discharge Time: 10:05 AM   Original-Treating Physician or Chiropractor    Page 2-Insurer/TPA    Page 3-Employer    Page 4-Employee

## 2017-12-13 ENCOUNTER — OCCUPATIONAL MEDICINE (OUTPATIENT)
Dept: OCCUPATIONAL MEDICINE | Facility: CLINIC | Age: 52
End: 2017-12-13
Payer: COMMERCIAL

## 2017-12-13 VITALS
OXYGEN SATURATION: 97 % | SYSTOLIC BLOOD PRESSURE: 114 MMHG | HEART RATE: 91 BPM | TEMPERATURE: 98.8 F | DIASTOLIC BLOOD PRESSURE: 64 MMHG | WEIGHT: 205 LBS | HEIGHT: 73 IN | RESPIRATION RATE: 16 BRPM | BODY MASS INDEX: 27.17 KG/M2

## 2017-12-13 DIAGNOSIS — S76.212A STRAIN OF GROIN, LEFT, INITIAL ENCOUNTER: ICD-10-CM

## 2017-12-13 PROCEDURE — 99203 OFFICE O/P NEW LOW 30 MIN: CPT | Performed by: PREVENTIVE MEDICINE

## 2017-12-13 ASSESSMENT — PAIN SCALES - GENERAL: PAINLEVEL: NO PAIN

## 2017-12-13 NOTE — LETTER
[address],   [address] [place] [address]  Phone:  [phone] - Fax:  [phone]   [facility] Progress Report and Disability Certification  Date of Service: 12/13/2017   No Show:  No  Date / Time of Next Visit: 1/2/2018@3:30PM    Claim Information   Patient Name: Frantz Parker  Claim Number:     Employer: [employer]  Date of Injury: 11/28/2017     Insurer / TPA: Troy Hull  ID / SSN:     Occupation:   Diagnosis: The encounter diagnosis was Strain of groin, left, initial encounter.    Medical Information   Related to Industrial Injury?   Comments:indeterminate    Subjective Complaints:  DOI 11/28/17: 52 year old male presents for left groin strain. Patient states he pulling up to break the pipes loose and felt a pull/strain in his left groin. Seen in [facility], noted some improvement and was put back on full duty. Patient states that overall the pain is about the same. He has occasional pain in the left inguinal area. Pain does not radiate, denies any abdominal pain, nausea or vomiting. Pain sometimes worse with lifting. He states it feels just like his previous hernia.   Objective Findings: Abdomen/: No visible bulging. Scrotal/teste grossly normal. Mild tenderness along the left inguinal canal. No hernia felt on exam.    Pre-Existing Condition(s): Bilateral inguinal hernia repair 4 years ago   Assessment:   Condition Same    Status: Additional Care Required  Permanent Disability:No    Plan:      Diagnostics:      Comments:  Referral for ultrasound to evaluate for hernia  Continue ibuprofen as needed   Okay for full duty  Follow-up 3 weeks, sooner if ultrasound performed sooner    Disability Information   Status: Released to Full Duty    From:  12/13/2017  Through: 1/2/2018 Restrictions are:     Physical Restrictions   Sitting:    Standing:    Stooping:    Bending:      Squatting:    Walking:    Climbing:   Pushing:      Pulling:    Other:    Reaching Above Shoulder (L):   Reaching Above Shoulder (R):       Reaching Below Shoulder (L):    Reaching Below Shoulder (R):      Not to exceed Weight Limits   Carrying(hrs):   Weight Limit(lb):   Lifting(hrs):   Weight  Limit(lb):     Comments:      Repetitive Actions   Hands: i.e. Fine Manipulations from Grasping:     Feet: i.e. Operating Foot Controls:     Driving / Operate Machinery:     Physician Name: Florencio Mcclendon D.O. Physician Signature: FLORENCIO Mora D.O. e-Signature: Dr. Francisco J Taylor, Medical Director   Clinic Name / Location: [address],   [address]  [place] [address] Clinic Phone Number: Dept: [phone]   Appointment Time: 1:15 Pm Visit Start Time: 12:57 PM   Check-In Time:  12:52 Pm Visit Discharge Time: 1:12PM    Original-Treating Physician or Chiropractor    Page 2-Insurer/TPA    Page 3-Employer    Page 4-Employee

## 2017-12-13 NOTE — PROGRESS NOTES
"Subjective:      Frantz Parker is a 52 y.o. male who presents with Follow-Up ([facility] DOI 11/28/2017 - Groin - Same - Pro Room 1)      DOI 11/28/17: 52 year old male presents for left groin strain. Patient states he pulling up to break the pipes loose and felt a pull/strain in his left groin. Seen in [facility], noted some improvement and was put back on full duty. Patient states that overall the pain is about the same. He has occasional pain in the left inguinal area. Pain does not radiate, denies any abdominal pain, nausea or vomiting. Pain sometimes worse with lifting. He states it feels just like his previous hernia.     HPI    ROS  ROS: All systems were reviewed on intake form, form was reviewed and signed. See scanned documents in media. Pertinent positives and negatives included in HPI.    PMH: Bilateral inguinal hernia repair 4 years ago  MEDS: Medications were reviewed in Epic  ALLERGIES: No Known Allergies  SOCHX: Works as a  at [employer]   FH: No pertinent family history to this problem     Objective:     /64   Pulse 91   Temp 37.1 °C (98.8 °F)   Resp 16   Ht 1.854 m (6' 1\")   Wt 93 kg (205 lb)   SpO2 97%   BMI 27.05 kg/m²      Physical Exam   Constitutional: He is oriented to person, place, and time. He appears well-developed and well-nourished.   HENT:   Right Ear: External ear normal.   Left Ear: External ear normal.   Eyes: Conjunctivae and EOM are normal.   Cardiovascular: Normal rate.    Pulmonary/Chest: Effort normal. No respiratory distress.   Neurological: He is alert and oriented to person, place, and time.   Skin: Skin is warm and dry.   Psychiatric: He has a normal mood and affect. Judgment normal.       Abdomen/: No visible bulging. Scrotal/teste grossly normal. Mild tenderness along the left inguinal canal. No hernia felt on exam.        Assessment/Plan:     1. Strain of groin, left, initial encounter  - US-ABDOMEN LIMITED; Future  - REFERRAL TO RADIOLOGY    Referral for " I faxed the neb rx (signed by Dr. Handy). These rxs are on my desk. /DAIANA Haywood     ultrasound to evaluate for hernia  Continue ibuprofen as needed   Okay for full duty  Follow-up 3 weeks, sooner if ultrasound performed sooner

## 2018-01-02 ENCOUNTER — OCCUPATIONAL MEDICINE (OUTPATIENT)
Dept: OCCUPATIONAL MEDICINE | Facility: CLINIC | Age: 53
End: 2018-01-02
Payer: COMMERCIAL

## 2018-01-02 VITALS
HEART RATE: 72 BPM | BODY MASS INDEX: 27.17 KG/M2 | RESPIRATION RATE: 16 BRPM | WEIGHT: 205 LBS | DIASTOLIC BLOOD PRESSURE: 80 MMHG | TEMPERATURE: 99.1 F | SYSTOLIC BLOOD PRESSURE: 118 MMHG | OXYGEN SATURATION: 95 % | HEIGHT: 73 IN

## 2018-01-02 DIAGNOSIS — K40.90 LEFT INGUINAL HERNIA: ICD-10-CM

## 2018-01-02 DIAGNOSIS — S76.212A STRAIN OF GROIN, LEFT, INITIAL ENCOUNTER: ICD-10-CM

## 2018-01-02 PROCEDURE — 99212 OFFICE O/P EST SF 10 MIN: CPT | Performed by: PREVENTIVE MEDICINE

## 2018-01-02 ASSESSMENT — PAIN SCALES - GENERAL: PAINLEVEL: 3=SLIGHT PAIN

## 2018-01-02 NOTE — LETTER
[address],   [address] [place] [address]  Phone:  [phone] - Fax:  [phone]   [facility] Progress Report and Disability Certification  Date of Service: 1/2/2018   No Show:  No  Date / Time of Next Visit:   Transfer care to general surgery   Claim Information   Patient Name: Frantz Parker  Claim Number:     Employer: [employer]  Date of Injury: 11/28/2017     Insurer / TPA: Troy Hull  ID / SSN:     Occupation:   Diagnosis: Diagnoses of Strain of groin, left, initial encounter and Left inguinal hernia were pertinent to this visit.    Medical Information   Related to Industrial Injury?   Comments:indeterminant    Subjective Complaints:  DOI 11/28/17: 52 year old male presents for left groin strain. Patient states he pulling up to break the pipes loose and felt a pull/strain in his left groin. Patient states overall he is some improvement in his left groin pain. Working normal job. Ultrasound was performed about a week ago.    Objective Findings: Abdomen/: Exam unchanged. No visible bulging. Scrotal/teste grossly normal. Minimal tenderness along the left inguinal canal.    Pre-Existing Condition(s):     Assessment:   Condition Improved    Status: Discharged / Care Transfer  Permanent Disability:No    Plan:      Diagnostics:      Comments:  US noted recurrent left inguinal hernia  Referral and transfer of care to Gen. surgery, if claim denied seek care in private basis  Full duty    Disability Information   Status: Released to Full Duty    From:  1/2/2018  Through:   Restrictions are:     Physical Restrictions   Sitting:    Standing:    Stooping:    Bending:      Squatting:    Walking:    Climbing:    Pushing:      Pulling:    Other:    Reaching Above Shoulder (L):   Reaching Above Shoulder (R):       Reaching Below Shoulder (L):    Reaching Below Shoulder (R):      Not to exceed Weight Limits      Carrying(hrs):   Weight Limit(lb):   Lifting(hrs):   Weight  Limit(lb):     Comments:      Repetitive Actions   Hands: i.e. Fine Manipulations from Grasping:     Feet: i.e. Operating Foot Controls:     Driving / Operate Machinery:     Physician Name: Florencio Mcclendon D.O. Physician Signature: simonaSignTAYLFLORENCIO CORCORAN D.O. e-Signature: Dr. Francisco J Taylor, Medical Director   Clinic Name / Location: [address],   [address] [address] Clinic Phone Number: Dept: [phone]   Appointment Time: 3:30 Pm Visit Start Time: 3:25 PM   Check-In Time:  3:19 Pm Visit Discharge Time:  3:43PM    Original-Treating Physician or Chiropractor    Page 2-Insurer/TPA    Page 3-Employer    Page 4-Employee

## 2018-01-02 NOTE — PROGRESS NOTES
"Subjective:      Frantz Parker is a 52 y.o. male who presents with Follow-Up ([facility] DOI 11/30/17 - Feeling better - Groin - ROOM 3)      DOI 11/28/17: 52 year old male presents for left groin strain. Patient states he pulling up to break the pipes loose and felt a pull/strain in his left groin. Patient states overall he is some improvement in his left groin pain. Working normal job. Ultrasound was performed about a week ago.      HPI    ROS  PMH: Prior left inguinal hernia repair     Objective:     /80   Pulse 72   Temp 37.3 °C (99.1 °F)   Resp 16   Ht 1.854 m (6' 1\")   Wt 93 kg (205 lb)   SpO2 95%   BMI 27.05 kg/m²      Physical Exam    Abdomen/: Exam unchanged. No visible bulging. Scrotal/teste grossly normal. Minimal tenderness along the left inguinal canal.        Assessment/Plan:     1. Strain of groin, left, initial encounter  - REFERRAL TO GENERAL SURGERY    2. Left inguinal hernia  - REFERRAL TO GENERAL SURGERY    US noted recurrent left inguinal hernia  Referral and transfer of care to Gen. surgery, if claim denied seek care in private basis  Full duty  "

## 2018-02-06 ENCOUNTER — HOSPITAL ENCOUNTER (OUTPATIENT)
Dept: RADIOLOGY | Facility: MEDICAL CENTER | Age: 53
End: 2018-02-06

## 2018-03-06 ENCOUNTER — APPOINTMENT (OUTPATIENT)
Dept: ADMISSIONS | Facility: MEDICAL CENTER | Age: 53
End: 2018-03-06
Attending: SURGERY
Payer: COMMERCIAL

## 2018-03-06 RX ORDER — IBUPROFEN 200 MG
600 TABLET ORAL EVERY 6 HOURS PRN
COMMUNITY
End: 2020-05-07

## 2018-03-07 ENCOUNTER — HOSPITAL ENCOUNTER (OUTPATIENT)
Facility: MEDICAL CENTER | Age: 53
End: 2018-03-07
Attending: SURGERY | Admitting: SURGERY
Payer: COMMERCIAL

## 2018-03-07 VITALS
WEIGHT: 209.22 LBS | OXYGEN SATURATION: 95 % | TEMPERATURE: 97.7 F | DIASTOLIC BLOOD PRESSURE: 78 MMHG | RESPIRATION RATE: 16 BRPM | BODY MASS INDEX: 27.73 KG/M2 | HEIGHT: 73 IN | HEART RATE: 67 BPM | SYSTOLIC BLOOD PRESSURE: 106 MMHG

## 2018-03-07 DIAGNOSIS — G89.18 POST-OPERATIVE PAIN: ICD-10-CM

## 2018-03-07 PROCEDURE — 700111 HCHG RX REV CODE 636 W/ 250 OVERRIDE (IP)

## 2018-03-07 PROCEDURE — 500380 HCHG DRAIN, PENROSE 1/4X12: Performed by: SURGERY

## 2018-03-07 PROCEDURE — 160046 HCHG PACU - 1ST 60 MINS PHASE II: Performed by: SURGERY

## 2018-03-07 PROCEDURE — 160048 HCHG OR STATISTICAL LEVEL 1-5: Performed by: SURGERY

## 2018-03-07 PROCEDURE — 501445 HCHG STAPLER, SKIN DISP: Performed by: SURGERY

## 2018-03-07 PROCEDURE — A9270 NON-COVERED ITEM OR SERVICE: HCPCS

## 2018-03-07 PROCEDURE — 160028 HCHG SURGERY MINUTES - 1ST 30 MINS LEVEL 3: Performed by: SURGERY

## 2018-03-07 PROCEDURE — 160002 HCHG RECOVERY MINUTES (STAT): Performed by: SURGERY

## 2018-03-07 PROCEDURE — 500698 HCHG HEMOCLIP, MEDIUM: Performed by: SURGERY

## 2018-03-07 PROCEDURE — A6402 STERILE GAUZE <= 16 SQ IN: HCPCS | Performed by: SURGERY

## 2018-03-07 PROCEDURE — 160025 RECOVERY II MINUTES (STATS): Performed by: SURGERY

## 2018-03-07 PROCEDURE — 160039 HCHG SURGERY MINUTES - EA ADDL 1 MIN LEVEL 3: Performed by: SURGERY

## 2018-03-07 PROCEDURE — 501838 HCHG SUTURE GENERAL: Performed by: SURGERY

## 2018-03-07 PROCEDURE — C1781 MESH (IMPLANTABLE): HCPCS | Performed by: SURGERY

## 2018-03-07 PROCEDURE — 700102 HCHG RX REV CODE 250 W/ 637 OVERRIDE(OP)

## 2018-03-07 PROCEDURE — 160035 HCHG PACU - 1ST 60 MINS PHASE I: Performed by: SURGERY

## 2018-03-07 PROCEDURE — 160009 HCHG ANES TIME/MIN: Performed by: SURGERY

## 2018-03-07 PROCEDURE — 700101 HCHG RX REV CODE 250

## 2018-03-07 DEVICE — MESH PERFIX PLUG LIGHT MED - 3.3CM X 3.9CM  (1EA/CA): Type: IMPLANTABLE DEVICE | Site: INGUINAL | Status: FUNCTIONAL

## 2018-03-07 RX ORDER — OXYCODONE HCL 5 MG/5 ML
SOLUTION, ORAL ORAL
Status: COMPLETED
Start: 2018-03-07 | End: 2018-03-07

## 2018-03-07 RX ORDER — GABAPENTIN 300 MG/1
CAPSULE ORAL
Status: COMPLETED
Start: 2018-03-07 | End: 2018-03-07

## 2018-03-07 RX ORDER — HYDROCODONE BITARTRATE AND ACETAMINOPHEN 5; 325 MG/1; MG/1
1-2 TABLET ORAL EVERY 4 HOURS PRN
Qty: 20 TAB | Refills: 0 | Status: SHIPPED | OUTPATIENT
Start: 2018-03-07 | End: 2018-03-11

## 2018-03-07 RX ORDER — ACETAMINOPHEN 500 MG
TABLET ORAL
Status: COMPLETED
Start: 2018-03-07 | End: 2018-03-07

## 2018-03-07 RX ORDER — BUPIVACAINE HYDROCHLORIDE AND EPINEPHRINE 5; 5 MG/ML; UG/ML
INJECTION, SOLUTION EPIDURAL; INTRACAUDAL; PERINEURAL
Status: DISCONTINUED | OUTPATIENT
Start: 2018-03-07 | End: 2018-03-07 | Stop reason: HOSPADM

## 2018-03-07 RX ORDER — SODIUM CHLORIDE, SODIUM LACTATE, POTASSIUM CHLORIDE, CALCIUM CHLORIDE 600; 310; 30; 20 MG/100ML; MG/100ML; MG/100ML; MG/100ML
INJECTION, SOLUTION INTRAVENOUS CONTINUOUS
Status: DISCONTINUED | OUTPATIENT
Start: 2018-03-07 | End: 2018-03-07 | Stop reason: HOSPADM

## 2018-03-07 RX ORDER — POLYETHYLENE GLYCOL 3350 17 G/17G
17 POWDER, FOR SOLUTION ORAL DAILY
COMMUNITY
End: 2020-05-07

## 2018-03-07 RX ORDER — CELECOXIB 200 MG/1
CAPSULE ORAL
Status: COMPLETED
Start: 2018-03-07 | End: 2018-03-07

## 2018-03-07 RX ADMIN — ACETAMINOPHEN 1000 MG: 500 TABLET, FILM COATED ORAL at 09:49

## 2018-03-07 RX ADMIN — OXYCODONE HYDROCHLORIDE 10 MG: 5 SOLUTION ORAL at 11:45

## 2018-03-07 RX ADMIN — GABAPENTIN 300 MG: 300 CAPSULE ORAL at 09:49

## 2018-03-07 RX ADMIN — CELECOXIB 400 MG: 200 CAPSULE ORAL at 09:48

## 2018-03-07 RX ADMIN — SODIUM CHLORIDE, SODIUM LACTATE, POTASSIUM CHLORIDE, CALCIUM CHLORIDE: 600; 310; 30; 20 INJECTION, SOLUTION INTRAVENOUS at 10:05

## 2018-03-07 ASSESSMENT — PAIN SCALES - GENERAL
PAINLEVEL_OUTOF10: 2
PAINLEVEL_OUTOF10: ASSUMED PAIN PRESENT

## 2018-03-07 NOTE — DISCHARGE INSTRUCTIONS
ACTIVITY: Rest and take it easy for the first 24 hours.  A responsible adult is recommended to remain with you during that time.  It is normal to feel sleepy.  We encourage you to not do anything that requires balance, judgment or coordination.    MILD FLU-LIKE SYMPTOMS ARE NORMAL. YOU MAY EXPERIENCE GENERALIZED MUSCLE ACHES, THROAT IRRITATION, HEADACHE AND/OR SOME NAUSEA.    FOR 24 HOURS DO NOT:  Drive, operate machinery or run household appliances.  Drink beer or alcoholic beverages.   Make important decisions or sign legal documents.    SPECIAL INSTRUCTIONS: Hernia Repair Discharge Instructions:    1. ACTIVITIES: Upon discharge from the hospital, the day of surgery it is requested that you do no significant physical activity and limit mental activities, as you have had sedation. The day after surgery, you may resume activities of daily living, but for four weeks, it is recommended that you do no strenuous activities or heavy lifting (greater than 15 pounds).     2. DRIVING: You may drive whenever you are off pain medications and are able to perform the activities needed to drive, i.e. turning, bending, twisting, etc.     3. WOUND: It is not unusual for patients to experience swelling and even bruising at the hernia repair site. With inguinal hernias, sometimes the bruising and swelling may extend on to the penis or into the scrotum of male patients. This will resolve over the next few days.     4. ICE: please use ice on the wound to decrease the swelling for the first 24 hours and then discontinue.     5. BATHING: The dressing can be removed two days after surgery and the wound can then be wetted in a shower as normal, but avoid submersion in water (tub bath) for at least a week. Leave steri strips on until they fall off.  It may be necessary to trim the edges.    6. PAIN MEDICATION: You will be given a prescription for pain medication at discharge. Please take these as directed. It is important to remember not  to take medications on an empty stomach as this may cause nausea.     7. BOWEL FUNCTION: After hernia repair, it is not uncommon for patients to experience constipation. This is due to decreasing activity levels as well as pain medications. You may wish to use a stool softener beginning immediately after surgery, and you may or may not need to use a laxative (Milk of Magnesia, Ex-lax; Senokot, etc.) as well.     8 .CALL IF YOU HAVE: (1) Fevers to more than 101 F, (2) Unusual chest or leg pain, (3) Drainage or fluid from incision that may be foul smelling, increased tenderness or soreness at the wound or the wound edges are no longer together,redness or swelling at the incision site. Please do not hesitate to call with any other questions.     9. APPOINTMENT: Contact our office at [phone] for a follow-up appointment in 1 to 2 weeks following your procedure.     If you have any additional questions, please do not hesitate to call the office and speak to either myself or the physician on call.     Office address:  [address] [place] [address], [address]      Chi Gutiérrez M.D.  [facility]  [phone]      DIET: To avoid nausea, slowly advance diet as tolerated, avoiding spicy or greasy foods for the first day.  Add more substantial food to your diet according to your physician's instructions. INCREASE FLUIDS AND FIBER TO AVOID CONSTIPATION.    SURGICAL DRESSING/BATHING: see above    FOLLOW-UP APPOINTMENT:  A follow-up appointment should be arranged with your doctor; call to schedule.    You should CALL YOUR PHYSICIAN if you develop:  Fever greater than 101 degrees F.  Pain not relieved by medication, or persistent nausea or vomiting.  Excessive bleeding (blood soaking through dressing) or unexpected drainage from the wound.  Extreme redness or swelling around the incision site, drainage of pus or foul smelling drainage.  Inability to urinate or empty your bladder within 8 hours.  Problems with breathing or  chest pain.    You should call 911 if you develop problems with breathing or chest pain.  If you are unable to contact your doctor or surgical center, you should go to the nearest emergency room or urgent care center.  Physician's telephone #: Dr. Gutiérrez [phone].    If any questions arise, call your doctor.  If your doctor is not available, please feel free to call the Surgical Center at [phone].  The Center is open Monday through Friday from 7AM to 7PM.  You can also call the HEALTH HOTLINE open 24 hours/day, 7 days/week and speak to a nurse at [phone], or toll free at [phone].    A registered nurse may call you a few days after your surgery to see how you are doing after your procedure.    MEDICATIONS: Resume taking daily medication.  Take prescribed pain medication with food.  If no medication is prescribed, you may take non-aspirin pain medication if needed.  PAIN MEDICATION CAN BE VERY CONSTIPATING.  Take a stool softener or laxative such as senokot, pericolace, or milk of magnesia if needed.    Prescription given for Norco.  Last pain medication given at 11:45AM.    If your physician has prescribed pain medication that includes Acetaminophen (Tylenol), do not take additional Acetaminophen (Tylenol) while taking the prescribed medication.    Depression / Suicide Risk    As you are discharged from this [facility] facility, it is important to learn how to keep safe from harming yourself.    Recognize the warning signs:  · Abrupt changes in personality, positive or negative- including increase in energy   · Giving away possessions  · Change in eating patterns- significant weight changes-  positive or negative  · Change in sleeping patterns- unable to sleep or sleeping all the time   · Unwillingness or inability to communicate  · Depression  · Unusual sadness, discouragement and loneliness  · Talk of wanting to die  · Neglect of personal appearance   · Rebelliousness- reckless  behavior  · Withdrawal from people/activities they love  · Confusion- inability to concentrate     If you or a loved one observes any of these behaviors or has concerns about self-harm, here's what you can do:  · Talk about it- your feelings and reasons for harming yourself  · Remove any means that you might use to hurt yourself (examples: pills, rope, extension cords, firearm)  · Get professional help from the community (Mental Health, Substance Abuse, psychological counseling)  · Do not be alone:Call your Safe Contact- someone whom you trust who will be there for you.  · Call your local CRISIS HOTLINE 220-5824 or 445-258-7030  · Call your local Children's Mobile Crisis Response Team [place] (042) 654-5238 or www.[employer]  · Call the toll free National Suicide Prevention Hotlines   · National Suicide Prevention Lifeline 255-634-HJBV (9166)  · National Hope Line Network 800-SUICIDE (159-8491)

## 2018-03-07 NOTE — OP REPORT
03/07/18    OPERATIVE NOTE    PRE-OPERATIVE DIAGNOSIS - recurrent left inguinal hernia    POST-OPERATIVE DIAGNOSIS -same    PROCEDURE PERFORMED -open repair of recurrent left inguinal hernia    SURGEON - Chi Gutiérrez M.D.    ASSISTANT - TOMMY Castro    TYPE OF ANESTHESIA - General     ANESTHESIOLOGIST  - associated      SPECIMENS -none    INDICATIONS - 52 y.o.      PROCEDURE IN DETAIL -patient was taken to the operating suite placed in the supine position after adequate anesthesia the patient's left groin region was shaved and prepped draped in sterile fashion. An incision was made in oblique fashion overlying the inguinal ligament incision was carried down to the subcutaneous tissue and through scar tissue of the patient's previous repair. Incision was carried down to the external oblique aponeuroses. The external oblique was opened exposing the contents of the canal. The mesh was identified. There appeared to be a small recurrence through the hernia mesh along the cord structure. The hernia was dissected from the cord structures and reduced within the peritoneal cavity. A Bard plug was placed within the resultant defect. Mesh was then applied over the top of the plug and secured to the shelving edge of the inguinal ligament inferiorly and to the conjoined tendon superiorly. After the mesh was in good position and secured the external oblique aponeuroses was then closed over the repair using 2-0 Vicryl running suture. Half percent Marcaine with epinephrine was infiltrated into the surrounding region. 3-0 Vicryl subcutaneous sutures were placed followed by 4.0 Stratafix. Benzoin and Steri-Strip sterile dressings were applied    Chi Gutiérrez M.D.

## 2018-07-25 ENCOUNTER — TELEPHONE (OUTPATIENT)
Dept: SLEEP MEDICINE | Facility: MEDICAL CENTER | Age: 53
End: 2018-07-25

## 2018-07-25 NOTE — TELEPHONE ENCOUNTER
Need to get this pt seen ASAP, Call if we have an opening.  , and having cpap problems, he will be considered a NP after 11/17/18, currently on for 11/19/18.

## 2018-07-27 ENCOUNTER — SLEEP CENTER VISIT (OUTPATIENT)
Dept: SLEEP MEDICINE | Facility: MEDICAL CENTER | Age: 53
End: 2018-07-27
Payer: COMMERCIAL

## 2018-07-27 VITALS
BODY MASS INDEX: 27.83 KG/M2 | WEIGHT: 210 LBS | HEIGHT: 73 IN | RESPIRATION RATE: 15 BRPM | OXYGEN SATURATION: 95 % | SYSTOLIC BLOOD PRESSURE: 120 MMHG | DIASTOLIC BLOOD PRESSURE: 72 MMHG | HEART RATE: 70 BPM

## 2018-07-27 DIAGNOSIS — Z87.891 HX OF TOBACCO USE, PRESENTING HAZARDS TO HEALTH: ICD-10-CM

## 2018-07-27 DIAGNOSIS — E78.49 OTHER HYPERLIPIDEMIA: ICD-10-CM

## 2018-07-27 DIAGNOSIS — G47.33 OSA (OBSTRUCTIVE SLEEP APNEA): ICD-10-CM

## 2018-07-27 PROCEDURE — 99214 OFFICE O/P EST MOD 30 MIN: CPT | Performed by: NURSE PRACTITIONER

## 2018-07-27 ASSESSMENT — ENCOUNTER SYMPTOMS
WEAKNESS: 0
DIAPHORESIS: 0
FEVER: 0
EYE PAIN: 0
RESPIRATORY NEGATIVE: 1
PSYCHIATRIC NEGATIVE: 1
WEIGHT LOSS: 0
CHILLS: 0
GASTROINTESTINAL NEGATIVE: 1
MUSCULOSKELETAL NEGATIVE: 1
BRUISES/BLEEDS EASILY: 0
EYE DISCHARGE: 0
NEUROLOGICAL NEGATIVE: 1
CARDIOVASCULAR NEGATIVE: 1

## 2018-07-27 NOTE — PROGRESS NOTES
Chief Complaint   Patient presents with   • Follow-Up     [facility] 2015 OVERDUE ANNUAL   • Apnea         HPI: This patient is a 53 y.o. male, who presents for overdue annual follow-up of obstructive sleep apnea.     Patient was last seen November 2015 with Bety GASCA.  To reiterate patient has a history of severe sleep apnea with an AHI of 55, minimum saturation 66%.  He has been prescribed BiPAP 16/12 cm H2O.  He has not used his BiPAP since February.  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  He reports his machine stopped working, particularly the humidification.  He requires replacement machine and supplies.  When he was using therapy he felt he benefited greatly.  He reported a good quality of sleep and denied EDS. he understands the importance of restarting therapy.    Patient is a former smoker 15-pack-year history quit in 2009.  Smoking cessation is strongly encouraged.  He denies urged to restart smoking.  He denies any pulmonary complaints including dyspnea, cough or wheeze.  He is not on any inhalers.  He has no diagnosis of COPD or asthma.    Patient follows with his primary care physician routinely for lab work.  He has a history of hypothyroid and HLD.  He is compliant with statin.  Denies muscle aches or pain.  Denies chest pain, pressure.    Past Medical History:   Diagnosis Date   • High cholesterol    • Hypothyroidism    • Migraine    • Obstructive sleep apnea    • Sleep apnea     CPAP   • Snoring    • Unspecified disorder of thyroid        Social History   Substance Use Topics   • Smoking status: Former Smoker     Packs/day: 1.00     Years: 15.00     Types: Cigarettes     Start date: 1/1/1994     Quit date: 1/1/2009   • Smokeless tobacco: Former User     Quit date: 1/1/2009   • Alcohol use No       Family History   Problem Relation Age of Onset   • Sleep Apnea Neg Hx        Immunization History   Administered Date(s) Administered   • Influenza  "LAIV (Nasal) 10/01/2015       Current medications as of today   Current Outpatient Prescriptions   Medication Sig Dispense Refill   • Aspirin-Acetaminophen-Caffeine (EXCEDRIN MIGRAINE PO) Take 2 Tabs by mouth 1 time daily as needed.     • Multiple Vitamin (MULTI-VITAMIN DAILY PO) Take 1 Tab by mouth every day.     • Coenzyme Q10 (COQ10 PO) Take 1 Tab by mouth every day.     • rosuvastatin (CRESTOR) 5 MG Tab Take 5 mg by mouth every day.     • levothyroxine (SYNTHROID) 150 MCG TABS Take 150 mcg by mouth every day.     • polyethylene glycol/lytes (MIRALAX) Pack Take 17 g by mouth every day.     • ibuprofen (ADVIL) 200 MG Tab Take 600 mg by mouth every 6 hours as needed for Mild Pain.       No current facility-administered medications for this visit.        Allergies: Patient has no known allergies.    Blood pressure 120/72, pulse 70, resp. rate 15, height 1.854 m (6' 1\"), weight 95.3 kg (210 lb), SpO2 95 %.      Review of Systems   Constitutional: Positive for malaise/fatigue. Negative for chills, diaphoresis, fever and weight loss.   HENT: Negative.    Eyes: Negative for pain and discharge.   Respiratory: Negative.    Cardiovascular: Negative.    Gastrointestinal: Negative.    Musculoskeletal: Negative.    Skin: Negative.    Neurological: Negative.  Negative for weakness.   Endo/Heme/Allergies: Negative for environmental allergies. Does not bruise/bleed easily.   Psychiatric/Behavioral: Negative.        Physical Exam   Constitutional: He is oriented to person, place, and time and well-developed, well-nourished, and in no distress.   HENT:   Head: Normocephalic and atraumatic.   Eyes: Pupils are equal, round, and reactive to light.   Neck: Normal range of motion. Neck supple. No tracheal deviation present.   Cardiovascular: Normal rate, regular rhythm and normal heart sounds.    Pulmonary/Chest: Effort normal and breath sounds normal.   Musculoskeletal: Normal range of motion.   Neurological: He is alert and oriented " to person, place, and time.   Skin: Skin is warm and dry.   Psychiatric: Mood, memory, affect and judgment normal.   Vitals reviewed.      Diagnoses/Plan:    1. HEAVEN (obstructive sleep apnea)  Restart BiPAP therapy ASAP.  Prescription for new machine provided.  - DME BIPAP    2. Hx of tobacco use, presenting hazards to health  Permanent and complete smoking cessation recommended.  Patient denies urge to restart smoking.  He denies pulmonary complaints.    3. Other hyperlipidemia  Follows with PCP routinely for blood work.  He remains compliant with Crestor.  Denies myalgias, chest pain pressure.    Patient will follow-up here in 3 months for compliance download.  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    He will follow-up with his PCP as scheduled

## 2018-07-29 ENCOUNTER — OFFICE VISIT (OUTPATIENT)
Dept: URGENT CARE | Facility: PHYSICIAN GROUP | Age: 53
End: 2018-07-29
Payer: COMMERCIAL

## 2018-07-29 VITALS
HEART RATE: 74 BPM | BODY MASS INDEX: 28.1 KG/M2 | WEIGHT: 212 LBS | RESPIRATION RATE: 18 BRPM | TEMPERATURE: 98.7 F | OXYGEN SATURATION: 98 % | HEIGHT: 73 IN | SYSTOLIC BLOOD PRESSURE: 118 MMHG | DIASTOLIC BLOOD PRESSURE: 76 MMHG

## 2018-07-29 DIAGNOSIS — S05.01XA ABRASION OF RIGHT CORNEA, INITIAL ENCOUNTER: ICD-10-CM

## 2018-07-29 PROCEDURE — 99214 OFFICE O/P EST MOD 30 MIN: CPT | Performed by: FAMILY MEDICINE

## 2018-07-29 RX ORDER — POLYMYXIN B SULFATE AND TRIMETHOPRIM 1; 10000 MG/ML; [USP'U]/ML
1 SOLUTION OPHTHALMIC EVERY 4 HOURS
Qty: 10 ML | Refills: 0 | Status: SHIPPED | OUTPATIENT
Start: 2018-07-29 | End: 2020-05-07

## 2018-07-29 RX ORDER — LEVOTHYROXINE SODIUM 175 UG/1
175 TABLET ORAL
COMMUNITY
Start: 2018-07-13

## 2018-07-30 ASSESSMENT — ENCOUNTER SYMPTOMS
VOMITING: 0
BLURRED VISION: 0
EYE REDNESS: 1
NAUSEA: 0
DOUBLE VISION: 0
EYE ITCHING: 0
EYE PAIN: 1
FOREIGN BODY SENSATION: 0

## 2018-07-30 NOTE — PROGRESS NOTES
"Subjective:   Frantz Parker is a 53 y.o. male who presents for Eye Injury (R eye, tree branch hit eye X2 days )        Eye Injury    The right eye is affected. This is a new problem. The current episode started yesterday. The problem occurs constantly. The problem has been unchanged. The injury mechanism was a direct trauma. The pain is moderate. Associated symptoms include eye redness. Pertinent negatives include no blurred vision, double vision, foreign body sensation, itching, nausea or vomiting.     Review of Systems   Eyes: Positive for pain and redness. Negative for blurred vision, double vision and itching.   Gastrointestinal: Negative for nausea and vomiting.     No Known Allergies   Objective:   /76   Pulse 74   Temp 37.1 °C (98.7 °F)   Resp 18   Ht 1.854 m (6' 1\")   Wt 96.2 kg (212 lb)   SpO2 98%   BMI 27.97 kg/m²   Physical Exam   Constitutional: He is oriented to person, place, and time. He appears well-developed and well-nourished. No distress.   HENT:   Head: Normocephalic and atraumatic.   Eyes: Pupils are equal, round, and reactive to light. Conjunctivae and EOM are normal.   Right eye fluorescein uptake noted at 7 o'clock position.   Cardiovascular: Normal rate and regular rhythm.    No murmur heard.  Pulmonary/Chest: Effort normal and breath sounds normal. No respiratory distress.   Abdominal: Soft. He exhibits no distension. There is no tenderness.   Neurological: He is alert and oriented to person, place, and time. He has normal reflexes. No sensory deficit.   Skin: Skin is warm and dry.   Psychiatric: He has a normal mood and affect.         Assessment/Plan:   Assessment    1. Abrasion of right cornea, initial encounter  - polymixin-trimethoprim (POLYTRIM) 26568-5.1 UNIT/ML-% Solution; Place 1 Drop in both eyes every 4 hours.  Dispense: 10 mL; Refill: 0    Other orders  - levothyroxine (SYNTHROID) 175 MCG Tab;     Differential diagnosis, natural history, supportive care, and " indications for immediate follow-up discussed.

## 2018-08-20 ENCOUNTER — APPOINTMENT (RX ONLY)
Dept: URBAN - METROPOLITAN AREA CLINIC 4 | Facility: CLINIC | Age: 53
Setting detail: DERMATOLOGY
End: 2018-08-20

## 2018-08-20 DIAGNOSIS — D18.0 HEMANGIOMA: ICD-10-CM

## 2018-08-20 DIAGNOSIS — L85.3 XEROSIS CUTIS: ICD-10-CM

## 2018-08-20 DIAGNOSIS — L81.4 OTHER MELANIN HYPERPIGMENTATION: ICD-10-CM

## 2018-08-20 DIAGNOSIS — L73.8 OTHER SPECIFIED FOLLICULAR DISORDERS: ICD-10-CM

## 2018-08-20 DIAGNOSIS — L82.1 OTHER SEBORRHEIC KERATOSIS: ICD-10-CM

## 2018-08-20 DIAGNOSIS — L82.0 INFLAMED SEBORRHEIC KERATOSIS: ICD-10-CM

## 2018-08-20 DIAGNOSIS — D22 MELANOCYTIC NEVI: ICD-10-CM

## 2018-08-20 PROBLEM — D22.72 MELANOCYTIC NEVI OF LEFT LOWER LIMB, INCLUDING HIP: Status: ACTIVE | Noted: 2018-08-20

## 2018-08-20 PROBLEM — D22.71 MELANOCYTIC NEVI OF RIGHT LOWER LIMB, INCLUDING HIP: Status: ACTIVE | Noted: 2018-08-20

## 2018-08-20 PROBLEM — D22.62 MELANOCYTIC NEVI OF LEFT UPPER LIMB, INCLUDING SHOULDER: Status: ACTIVE | Noted: 2018-08-20

## 2018-08-20 PROBLEM — D22.61 MELANOCYTIC NEVI OF RIGHT UPPER LIMB, INCLUDING SHOULDER: Status: ACTIVE | Noted: 2018-08-20

## 2018-08-20 PROBLEM — D22.5 MELANOCYTIC NEVI OF TRUNK: Status: ACTIVE | Noted: 2018-08-20

## 2018-08-20 PROBLEM — E03.9 HYPOTHYROIDISM, UNSPECIFIED: Status: ACTIVE | Noted: 2018-08-20

## 2018-08-20 PROBLEM — D18.01 HEMANGIOMA OF SKIN AND SUBCUTANEOUS TISSUE: Status: ACTIVE | Noted: 2018-08-20

## 2018-08-20 PROBLEM — D48.5 NEOPLASM OF UNCERTAIN BEHAVIOR OF SKIN: Status: ACTIVE | Noted: 2018-08-20

## 2018-08-20 PROCEDURE — ? BIOPSY BY SHAVE METHOD

## 2018-08-20 PROCEDURE — ? SUNSCREEN RECOMMENDATIONS

## 2018-08-20 PROCEDURE — 17110 DESTRUCTION B9 LES UP TO 14: CPT

## 2018-08-20 PROCEDURE — 11100: CPT | Mod: 59

## 2018-08-20 PROCEDURE — ? COUNSELING

## 2018-08-20 PROCEDURE — ? PRESCRIPTION SAMPLES PROVIDED

## 2018-08-20 PROCEDURE — ? LIQUID NITROGEN

## 2018-08-20 PROCEDURE — 99214 OFFICE O/P EST MOD 30 MIN: CPT | Mod: 25

## 2018-08-20 ASSESSMENT — LOCATION SIMPLE DESCRIPTION DERM
LOCATION SIMPLE: RIGHT FOREARM
LOCATION SIMPLE: RIGHT POSTERIOR UPPER ARM
LOCATION SIMPLE: RIGHT HAND
LOCATION SIMPLE: LEFT FOREHEAD
LOCATION SIMPLE: RIGHT THIGH
LOCATION SIMPLE: LEFT HAND
LOCATION SIMPLE: RIGHT CHEEK
LOCATION SIMPLE: LEFT POSTERIOR UPPER ARM
LOCATION SIMPLE: UPPER BACK
LOCATION SIMPLE: LEFT CHEEK
LOCATION SIMPLE: RIGHT KNEE
LOCATION SIMPLE: ABDOMEN
LOCATION SIMPLE: LEFT ANTERIOR NECK
LOCATION SIMPLE: LEFT UPPER BACK
LOCATION SIMPLE: RIGHT UPPER BACK
LOCATION SIMPLE: LEFT THIGH
LOCATION SIMPLE: RIGHT LOWER BACK
LOCATION SIMPLE: LEFT FOREARM

## 2018-08-20 ASSESSMENT — LOCATION ZONE DERM
LOCATION ZONE: TRUNK
LOCATION ZONE: NECK
LOCATION ZONE: LEG
LOCATION ZONE: ARM
LOCATION ZONE: FACE
LOCATION ZONE: HAND

## 2018-08-20 ASSESSMENT — LOCATION DETAILED DESCRIPTION DERM
LOCATION DETAILED: RIGHT CENTRAL MALAR CHEEK
LOCATION DETAILED: SUPERIOR THORACIC SPINE
LOCATION DETAILED: PERIUMBILICAL SKIN
LOCATION DETAILED: LEFT ULNAR DORSAL HAND
LOCATION DETAILED: RIGHT ANTERIOR PROXIMAL THIGH
LOCATION DETAILED: RIGHT INFERIOR UPPER BACK
LOCATION DETAILED: RIGHT SUPERIOR MEDIAL MIDBACK
LOCATION DETAILED: LEFT PROXIMAL POSTERIOR UPPER ARM
LOCATION DETAILED: LEFT SUPERIOR MEDIAL UPPER BACK
LOCATION DETAILED: LEFT INFERIOR FOREHEAD
LOCATION DETAILED: RIGHT RADIAL DORSAL HAND
LOCATION DETAILED: RIGHT DISTAL POSTERIOR UPPER ARM
LOCATION DETAILED: RIGHT KNEE
LOCATION DETAILED: LEFT ANTERIOR PROXIMAL THIGH
LOCATION DETAILED: LEFT INFERIOR ANTERIOR NECK
LOCATION DETAILED: RIGHT RIB CAGE
LOCATION DETAILED: RIGHT PROXIMAL DORSAL FOREARM
LOCATION DETAILED: LEFT CENTRAL MALAR CHEEK
LOCATION DETAILED: LEFT PROXIMAL DORSAL FOREARM

## 2018-08-20 NOTE — HPI: FULL BODY SKIN EXAMINATION
How Severe Are Your Spot(S)?: moderate
What Is The Reason For Today's Visit?: Full Body Skin Examination
What Is The Reason For Today's Visit? (Being Monitored For X): the risk of recurrence of previously treated lesion(s)
Additional History: Mole on back changing for six months. FBE.

## 2018-08-20 NOTE — PROCEDURE: BIOPSY BY SHAVE METHOD
Billing Type: Third-Party Bill
X Size Of Lesion In Cm: 0
Lab Facility: 
Bill 62007 For Specimen Handling/Conveyance To Laboratory?: no
Notification Instructions: Patient will be notified of biopsy results. However, patient instructed to call the office if not contacted within 2 weeks.
Was A Bandage Applied: Yes
Post-Care Instructions: I reviewed with the patient in detail post-care instructions. Patient is to keep the biopsy site dry overnight, and then apply bacitracin twice daily until healed. Patient may apply hydrogen peroxide soaks to remove any crusting.
Cryotherapy Text: The wound bed was treated with cryotherapy after the biopsy was performed.
Depth Of Biopsy: dermis
Detail Level: Detailed
Electrodesiccation And Curettage Text: The wound bed was treated with electrodesiccation and curettage after the biopsy was performed.
Lab: [id number]
Consent: Written consent was obtained and risks were reviewed including but not limited to scarring, infection, bleeding, scabbing, incomplete removal, nerve damage and allergy to anesthesia.
Anesthesia Volume In Cc: 2
Dressing: bandage
Electrodesiccation Text: The wound bed was treated with electrodesiccation after the biopsy was performed.
Biopsy Method: Personna blade
Hemostasis: Drysol
Curettage Text: The wound bed was treated with curettage after the biopsy was performed.
Anesthesia Type: 1% lidocaine with epinephrine
Wound Care: Bacitracin
Silver Nitrate Text: The wound bed was treated with silver nitrate after the biopsy was performed.
Type Of Destruction Used: Curettage
Biopsy Type: H and E

## 2018-08-20 NOTE — PROCEDURE: LIQUID NITROGEN
Medical Necessity Information: It is in your best interest to select a reason for this procedure from the list below. All of these items fulfill various CMS LCD requirements except the new and changing color options.
Detail Level: Detailed
Medical Necessity Clause: This procedure was medically necessary because the lesions that were treated were:
Add 52 Modifier (Optional): no
Consent: The patient's consent was obtained including but not limited to risks of crusting, scabbing, blistering, scarring, darker or lighter pigmentary change, recurrence, incomplete removal and infection.
Render Post-Care Instructions In Note?: yes
Post-Care Instructions: I reviewed with the patient in detail post-care instructions. Patient is to wear sunprotection, and avoid picking at any of the treated lesions. Pt may apply Vaseline to crusted or scabbing areas.

## 2018-09-05 ENCOUNTER — APPOINTMENT (RX ONLY)
Dept: URBAN - METROPOLITAN AREA CLINIC 4 | Facility: CLINIC | Age: 53
Setting detail: DERMATOLOGY
End: 2018-09-05

## 2018-09-05 DIAGNOSIS — L40.0 PSORIASIS VULGARIS: ICD-10-CM

## 2018-09-05 PROCEDURE — 99212 OFFICE O/P EST SF 10 MIN: CPT

## 2018-09-05 PROCEDURE — ? COUNSELING

## 2018-09-05 PROCEDURE — ? PRESCRIPTION

## 2018-09-05 RX ORDER — TRIAMCINOLONE ACETONIDE 1 MG/G
1 CREAM TOPICAL QD
Qty: 1 | Refills: 1 | COMMUNITY
Start: 2018-09-05

## 2018-09-05 RX ORDER — CLOBETASOL PROPIONATE 0.5 MG/G
1 CREAM TOPICAL BID
Qty: 1 | Refills: 1 | Status: ERX | COMMUNITY
Start: 2018-09-05

## 2018-09-05 RX ADMIN — CLOBETASOL PROPIONATE 1: 0.5 CREAM TOPICAL at 00:00

## 2018-09-05 RX ADMIN — TRIAMCINOLONE ACETONIDE 1: 1 CREAM TOPICAL at 00:00

## 2018-09-05 ASSESSMENT — LOCATION ZONE DERM: LOCATION ZONE: TRUNK

## 2018-09-05 ASSESSMENT — LOCATION SIMPLE DESCRIPTION DERM: LOCATION SIMPLE: CHEST

## 2018-09-05 ASSESSMENT — LOCATION DETAILED DESCRIPTION DERM: LOCATION DETAILED: STERNUM

## 2018-10-11 ENCOUNTER — TELEPHONE (OUTPATIENT)
Dept: SLEEP MEDICINE | Facility: MEDICAL CENTER | Age: 53
End: 2018-10-11

## 2018-10-11 NOTE — TELEPHONE ENCOUNTER
Called [facility], they stated they hadn't received the 2012 SS. I sent that over and all should be ok but she will call me back to let me know.  I also told them he didn't really have a break in therapy his machine was just malfunctioning so he doesn't require a new SS. Waiting for Bettye to get back to me.

## 2018-10-11 NOTE — TELEPHONE ENCOUNTER
[facility]. He says that [facility] has been giving him the run around about getting a new machine. He states they are now telling him he will need to repeat a sleep study since the type of machine has changed. Please advise.

## 2018-11-16 ENCOUNTER — TELEPHONE (OUTPATIENT)
Dept: SLEEP MEDICINE | Facility: MEDICAL CENTER | Age: 53
End: 2018-11-16

## 2018-11-16 DIAGNOSIS — G47.33 OSA (OBSTRUCTIVE SLEEP APNEA): ICD-10-CM

## 2018-11-16 NOTE — TELEPHONE ENCOUNTER
[facility] came into [facility]. He is requesting an updated order for his mask and supplies (including the heated tubing) be faxed to [facility]. Patient does have follow up appt with Silver 11/29/18

## 2018-11-29 ENCOUNTER — SLEEP CENTER VISIT (OUTPATIENT)
Dept: SLEEP MEDICINE | Facility: MEDICAL CENTER | Age: 53
End: 2018-11-29
Payer: COMMERCIAL

## 2018-11-29 VITALS
BODY MASS INDEX: 29.16 KG/M2 | DIASTOLIC BLOOD PRESSURE: 70 MMHG | SYSTOLIC BLOOD PRESSURE: 138 MMHG | HEIGHT: 73 IN | WEIGHT: 220 LBS | TEMPERATURE: 98.1 F | OXYGEN SATURATION: 96 % | RESPIRATION RATE: 16 BRPM | HEART RATE: 69 BPM

## 2018-11-29 DIAGNOSIS — G47.33 OSA (OBSTRUCTIVE SLEEP APNEA): ICD-10-CM

## 2018-11-29 DIAGNOSIS — Z87.891 HX OF TOBACCO USE, PRESENTING HAZARDS TO HEALTH: ICD-10-CM

## 2018-11-29 PROCEDURE — 99213 OFFICE O/P EST LOW 20 MIN: CPT | Performed by: NURSE PRACTITIONER

## 2018-11-29 ASSESSMENT — ENCOUNTER SYMPTOMS
PSYCHIATRIC NEGATIVE: 1
EYE DISCHARGE: 0
RESPIRATORY NEGATIVE: 1
BRUISES/BLEEDS EASILY: 0
EYE PAIN: 0
NEUROLOGICAL NEGATIVE: 1
MUSCULOSKELETAL NEGATIVE: 1
CONSTITUTIONAL NEGATIVE: 1
GASTROINTESTINAL NEGATIVE: 1
CARDIOVASCULAR NEGATIVE: 1

## 2018-11-29 NOTE — PROGRESS NOTES
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  He has been prescribed BiPAP 16/12 cm H2O. compliance report over the past 30 days shows 93% compliance, average use 6 hours 56 minutes per night, AHI is normal at 2.  He received a new machine after his last visit, pressures are comfortable.  He is requesting an order for new mask and heated tubing.  Otherwise he is sleeping well at night denies EDS or a.m. headache.     Patient is a former smoker 15-pack-year history quit in 2009.  Smoking cessation again advised.      Past Medical History:   Diagnosis Date   • High cholesterol    • Hypothyroidism    • Migraine    • Obstructive sleep apnea    • Sleep apnea     CPAP   • Snoring    • Unspecified disorder of thyroid        Social History   Substance Use Topics   • Smoking status: Former Smoker     Packs/day: 1.00     Years: 15.00     Types: Cigarettes     Start date: 1/1/1994     Quit date: 1/1/2009   • Smokeless tobacco: Former User     Quit date: 1/1/2009   • Alcohol use No       Family History   Problem Relation Age of Onset   • Sleep Apnea Neg Hx        Immunization History   Administered Date(s) Administered   • Influenza (IM) Preservative Free 09/21/2016   • Influenza LAIV (Nasal) 10/01/2015   • Influenza Vaccine Quad Inj (Pf) 11/17/2018       Current medications as of today   Current Outpatient Prescriptions   Medication Sig Dispense Refill   • polymixin-trimethoprim (POLYTRIM) 45726-6.1 UNIT/ML-% Solution Place 1 Drop in both eyes every 4 hours. 10 mL 0   • Aspirin-Acetaminophen-Caffeine (EXCEDRIN MIGRAINE PO) Take 2 Tabs by mouth 1 time daily as needed.     • Multiple Vitamin (MULTI-VITAMIN DAILY PO) Take 1 Tab by mouth every day.     • Coenzyme Q10 (COQ10 PO) Take 1 Tab by mouth every day.     • rosuvastatin  "(CRESTOR) 5 MG Tab Take 5 mg by mouth every day.     • levothyroxine (SYNTHROID) 175 MCG Tab      • polyethylene glycol/lytes (MIRALAX) Pack Take 17 g by mouth every day.     • ibuprofen (ADVIL) 200 MG Tab Take 600 mg by mouth every 6 hours as needed for Mild Pain.     • levothyroxine (SYNTHROID) 150 MCG TABS Take 150 mcg by mouth every day.       No current facility-administered medications for this visit.        Allergies: Patient has no known allergies.    Blood pressure 138/70, pulse 69, temperature 36.7 °C (98.1 °F), temperature source Temporal, resp. rate 16, height 1.854 m (6' 1\"), weight 99.8 kg (220 lb), SpO2 96 %.      Review of Systems   Constitutional: Negative.    HENT: Negative.    Eyes: Negative for pain and discharge.   Respiratory: Negative.    Cardiovascular: Negative.    Gastrointestinal: Negative.    Musculoskeletal: Negative.    Skin: Negative.    Neurological: Negative.    Endo/Heme/Allergies: Negative for environmental allergies. Does not bruise/bleed easily.   Psychiatric/Behavioral: Negative.        Physical Exam   Constitutional: He is oriented to person, place, and time and well-developed, well-nourished, and in no distress.   HENT:   Head: Normocephalic and atraumatic.   Eyes: Pupils are equal, round, and reactive to light.   Neck: Normal range of motion. Neck supple. No tracheal deviation present.   Cardiovascular: Normal rate, regular rhythm and normal heart sounds.    Pulmonary/Chest: Effort normal and breath sounds normal.   Musculoskeletal: Normal range of motion.   Neurological: He is alert and oriented to person, place, and time.   Skin: Skin is warm and dry.   Psychiatric: Mood, memory, affect and judgment normal.   Vitals reviewed.      Diagnoses/Plan:    1. HEAVEN (obstructive sleep apnea)   Continue BiPAP nightly, Clean mask & tubing weekly, Replace supplies as insurance will allow, RX for new supplies to DME, specifically mask and heated tube.  - DME Mask and Supplies    2. Hx of " tobacco use, presenting hazards to health  Permanent and complete smoking cessation recommend.      He will follow-up annually, sooner if necessary.      This dictation was created using voice recognition software. The accuracy of the dictation is limited to the abilities of the software. I expect there may be some errors of grammar and possibly content.

## 2019-06-25 ENCOUNTER — HOSPITAL ENCOUNTER (OUTPATIENT)
Dept: HOSPITAL 8 - CVU | Age: 54
Discharge: HOME | End: 2019-06-25
Attending: INTERNAL MEDICINE
Payer: COMMERCIAL

## 2019-06-25 DIAGNOSIS — Z87.891: ICD-10-CM

## 2019-06-25 DIAGNOSIS — E78.5: ICD-10-CM

## 2019-06-25 DIAGNOSIS — R06.02: Primary | ICD-10-CM

## 2019-06-25 PROCEDURE — 93306 TTE W/DOPPLER COMPLETE: CPT

## 2019-12-06 ENCOUNTER — APPOINTMENT (OUTPATIENT)
Dept: PULMONOLOGY | Facility: HOSPICE | Age: 54
End: 2019-12-06
Payer: COMMERCIAL

## 2019-12-26 ENCOUNTER — APPOINTMENT (OUTPATIENT)
Dept: RADIOLOGY | Facility: MEDICAL CENTER | Age: 54
End: 2019-12-26
Attending: PHYSICAL MEDICINE & REHABILITATION
Payer: COMMERCIAL

## 2020-01-03 ENCOUNTER — APPOINTMENT (OUTPATIENT)
Dept: RADIOLOGY | Facility: MEDICAL CENTER | Age: 55
End: 2020-01-03
Attending: PHYSICAL MEDICINE & REHABILITATION
Payer: COMMERCIAL

## 2020-01-28 ENCOUNTER — APPOINTMENT (OUTPATIENT)
Dept: RADIOLOGY | Facility: MEDICAL CENTER | Age: 55
End: 2020-01-28
Attending: PHYSICAL MEDICINE & REHABILITATION
Payer: COMMERCIAL

## 2020-01-31 ENCOUNTER — HOSPITAL ENCOUNTER (OUTPATIENT)
Dept: RADIOLOGY | Facility: MEDICAL CENTER | Age: 55
End: 2020-01-31
Attending: PHYSICAL MEDICINE & REHABILITATION
Payer: COMMERCIAL

## 2020-01-31 DIAGNOSIS — M54.50 LOW BACK PAIN, UNSPECIFIED BACK PAIN LATERALITY, UNSPECIFIED CHRONICITY, UNSPECIFIED WHETHER SCIATICA PRESENT: ICD-10-CM

## 2020-01-31 PROCEDURE — 72148 MRI LUMBAR SPINE W/O DYE: CPT

## 2020-05-07 ENCOUNTER — APPOINTMENT (OUTPATIENT)
Dept: ADMISSIONS | Facility: MEDICAL CENTER | Age: 55
DRG: 460 | End: 2020-05-07
Attending: ORTHOPAEDIC SURGERY
Payer: COMMERCIAL

## 2020-05-07 ENCOUNTER — HOSPITAL ENCOUNTER (OUTPATIENT)
Dept: RADIOLOGY | Facility: MEDICAL CENTER | Age: 55
DRG: 460 | End: 2020-05-07
Attending: ORTHOPAEDIC SURGERY | Admitting: ORTHOPAEDIC SURGERY
Payer: COMMERCIAL

## 2020-05-07 DIAGNOSIS — Z01.810 PRE-OPERATIVE CARDIOVASCULAR EXAMINATION: ICD-10-CM

## 2020-05-07 DIAGNOSIS — Z01.812 PRE-OPERATIVE LABORATORY EXAMINATION: ICD-10-CM

## 2020-05-07 DIAGNOSIS — Z01.811 PRE-OPERATIVE RESPIRATORY EXAMINATION: ICD-10-CM

## 2020-05-07 LAB
ABO + RH BLD: NORMAL
ALBUMIN SERPL BCP-MCNC: 4.3 G/DL (ref 3.2–4.9)
ALBUMIN/GLOB SERPL: 1.4 G/DL
ALP SERPL-CCNC: 104 U/L (ref 30–99)
ALT SERPL-CCNC: 23 U/L (ref 2–50)
ANION GAP SERPL CALC-SCNC: 11 MMOL/L (ref 7–16)
APPEARANCE UR: CLEAR
APTT PPP: 26.3 SEC (ref 24.7–36)
AST SERPL-CCNC: 18 U/L (ref 12–45)
BASOPHILS # BLD AUTO: 1.1 % (ref 0–1.8)
BASOPHILS # BLD: 0.04 K/UL (ref 0–0.12)
BILIRUB SERPL-MCNC: 0.6 MG/DL (ref 0.1–1.5)
BILIRUB UR QL STRIP.AUTO: NEGATIVE
BUN SERPL-MCNC: 15 MG/DL (ref 8–22)
CALCIUM SERPL-MCNC: 9.9 MG/DL (ref 8.5–10.5)
CHLORIDE SERPL-SCNC: 98 MMOL/L (ref 96–112)
CO2 SERPL-SCNC: 27 MMOL/L (ref 20–33)
COLOR UR: YELLOW
COVID ORDER STATUS COVID19: NORMAL
CREAT SERPL-MCNC: 1.19 MG/DL (ref 0.5–1.4)
EKG IMPRESSION: NORMAL
EOSINOPHIL # BLD AUTO: 0.09 K/UL (ref 0–0.51)
EOSINOPHIL NFR BLD: 2.6 % (ref 0–6.9)
ERYTHROCYTE [DISTWIDTH] IN BLOOD BY AUTOMATED COUNT: 45.3 FL (ref 35.9–50)
ERYTHROCYTE [SEDIMENTATION RATE] IN BLOOD BY WESTERGREN METHOD: 2 MM/HOUR (ref 0–20)
GLOBULIN SER CALC-MCNC: 3 G/DL (ref 1.9–3.5)
GLUCOSE SERPL-MCNC: 95 MG/DL (ref 65–99)
GLUCOSE UR STRIP.AUTO-MCNC: NEGATIVE MG/DL
HAV IGM SERPL QL IA: NORMAL
HBV CORE IGM SER QL: NORMAL
HBV SURFACE AG SER QL: NORMAL
HCT VFR BLD AUTO: 45.7 % (ref 42–52)
HCV AB SER QL: NORMAL
HGB BLD-MCNC: 15.7 G/DL (ref 14–18)
HIV 1+2 AB+HIV1 P24 AG SERPL QL IA: NORMAL
IMM GRANULOCYTES # BLD AUTO: 0.01 K/UL (ref 0–0.11)
IMM GRANULOCYTES NFR BLD AUTO: 0.3 % (ref 0–0.9)
INR PPP: 1.02 (ref 0.87–1.13)
KETONES UR STRIP.AUTO-MCNC: NEGATIVE MG/DL
LEUKOCYTE ESTERASE UR QL STRIP.AUTO: NEGATIVE
LYMPHOCYTES # BLD AUTO: 1.53 K/UL (ref 1–4.8)
LYMPHOCYTES NFR BLD: 43.6 % (ref 22–41)
MCH RBC QN AUTO: 32 PG (ref 27–33)
MCHC RBC AUTO-ENTMCNC: 34.4 G/DL (ref 33.7–35.3)
MCV RBC AUTO: 93.1 FL (ref 81.4–97.8)
MICRO URNS: NORMAL
MONOCYTES # BLD AUTO: 0.35 K/UL (ref 0–0.85)
MONOCYTES NFR BLD AUTO: 10 % (ref 0–13.4)
NEUTROPHILS # BLD AUTO: 1.49 K/UL (ref 1.82–7.42)
NEUTROPHILS NFR BLD: 42.4 % (ref 44–72)
NITRITE UR QL STRIP.AUTO: NEGATIVE
NRBC # BLD AUTO: 0 K/UL
NRBC BLD-RTO: 0 /100 WBC
PH UR STRIP.AUTO: 7.5 [PH] (ref 5–8)
PLATELET # BLD AUTO: 236 K/UL (ref 164–446)
PMV BLD AUTO: 9.9 FL (ref 9–12.9)
POTASSIUM SERPL-SCNC: 3.9 MMOL/L (ref 3.6–5.5)
PROT SERPL-MCNC: 7.3 G/DL (ref 6–8.2)
PROT UR QL STRIP: NEGATIVE MG/DL
PROTHROMBIN TIME: 13.6 SEC (ref 12–14.6)
RBC # BLD AUTO: 4.91 M/UL (ref 4.7–6.1)
RBC UR QL AUTO: NEGATIVE
SODIUM SERPL-SCNC: 136 MMOL/L (ref 135–145)
SP GR UR STRIP.AUTO: 1.01
UROBILINOGEN UR STRIP.AUTO-MCNC: 0.2 MG/DL
WBC # BLD AUTO: 3.5 K/UL (ref 4.8–10.8)

## 2020-05-07 PROCEDURE — 36415 COLL VENOUS BLD VENIPUNCTURE: CPT

## 2020-05-07 PROCEDURE — 85652 RBC SED RATE AUTOMATED: CPT

## 2020-05-07 PROCEDURE — 71046 X-RAY EXAM CHEST 2 VIEWS: CPT

## 2020-05-07 PROCEDURE — 80053 COMPREHEN METABOLIC PANEL: CPT

## 2020-05-07 PROCEDURE — 80074 ACUTE HEPATITIS PANEL: CPT

## 2020-05-07 PROCEDURE — 81003 URINALYSIS AUTO W/O SCOPE: CPT

## 2020-05-07 PROCEDURE — 85730 THROMBOPLASTIN TIME PARTIAL: CPT

## 2020-05-07 PROCEDURE — 85025 COMPLETE CBC W/AUTO DIFF WBC: CPT

## 2020-05-07 PROCEDURE — 85610 PROTHROMBIN TIME: CPT

## 2020-05-07 PROCEDURE — 93010 ELECTROCARDIOGRAM REPORT: CPT | Performed by: INTERNAL MEDICINE

## 2020-05-07 PROCEDURE — 87389 HIV-1 AG W/HIV-1&-2 AB AG IA: CPT

## 2020-05-07 PROCEDURE — 93005 ELECTROCARDIOGRAM TRACING: CPT

## 2020-05-07 PROCEDURE — C9803 HOPD COVID-19 SPEC COLLECT: HCPCS

## 2020-05-07 RX ORDER — SUMATRIPTAN 25 MG/1
25-100 TABLET, FILM COATED ORAL
Status: ON HOLD | COMMUNITY
End: 2020-05-12

## 2020-05-07 RX ORDER — GABAPENTIN 600 MG/1
600 TABLET ORAL 3 TIMES DAILY
COMMUNITY

## 2020-05-07 RX ORDER — EZETIMIBE 10 MG/1
10 TABLET ORAL DAILY
COMMUNITY

## 2020-05-07 RX ORDER — AMITRIPTYLINE HYDROCHLORIDE 25 MG/1
12.5 TABLET, FILM COATED ORAL NIGHTLY
Status: ON HOLD | COMMUNITY
End: 2020-05-12

## 2020-05-10 LAB
SARS-COV-2 RNA RESP QL NAA+PROBE: NOT DETECTED
SPECIMEN SOURCE: NORMAL

## 2020-05-11 ENCOUNTER — ANESTHESIA EVENT (OUTPATIENT)
Dept: SURGERY | Facility: MEDICAL CENTER | Age: 55
DRG: 460 | End: 2020-05-11
Payer: COMMERCIAL

## 2020-05-11 PROBLEM — E03.9 HYPOTHYROID: Chronic | Status: ACTIVE | Noted: 2020-05-11

## 2020-05-11 NOTE — OR NURSING
COVID-19 Pre-surgery screenin. Do you have an undiagnosed respiratory illness or symptoms such as coughing or sneezing? (No)  a. Onset of Sx N/A  b. Acute vs. chronic respiratory illness N/A     2. Do you have an unexplained fever greater than 100.4 degrees Fahrenheit or 38 degrees Celsius?          (No)     3. Have you had direct exposure to a patient who tested positive for Covid-19?              (No)     4. Have you traveled within the last 14 days.          (No)

## 2020-05-12 ENCOUNTER — APPOINTMENT (OUTPATIENT)
Dept: RADIOLOGY | Facility: MEDICAL CENTER | Age: 55
DRG: 460 | End: 2020-05-12
Attending: ORTHOPAEDIC SURGERY
Payer: COMMERCIAL

## 2020-05-12 ENCOUNTER — ANESTHESIA (OUTPATIENT)
Dept: SURGERY | Facility: MEDICAL CENTER | Age: 55
DRG: 460 | End: 2020-05-12
Payer: COMMERCIAL

## 2020-05-12 ENCOUNTER — HOSPITAL ENCOUNTER (INPATIENT)
Facility: MEDICAL CENTER | Age: 55
LOS: 3 days | DRG: 460 | End: 2020-05-15
Attending: ORTHOPAEDIC SURGERY | Admitting: ORTHOPAEDIC SURGERY
Payer: COMMERCIAL

## 2020-05-12 DIAGNOSIS — Z98.890 STATUS POST LUMBAR SPINE OPERATIVE PROCEDURE FOR DECOMPRESSION OF SPINAL CORD: ICD-10-CM

## 2020-05-12 LAB
ABO GROUP BLD: NORMAL
BLD GP AB SCN SERPL QL: NORMAL
HCT VFR BLD AUTO: 43.1 % (ref 42–52)
HGB BLD-MCNC: 15 G/DL (ref 14–18)
RH BLD: NORMAL

## 2020-05-12 PROCEDURE — 160009 HCHG ANES TIME/MIN: Performed by: ORTHOPAEDIC SURGERY

## 2020-05-12 PROCEDURE — 01NB0ZZ RELEASE LUMBAR NERVE, OPEN APPROACH: ICD-10-PCS | Performed by: ORTHOPAEDIC SURGERY

## 2020-05-12 PROCEDURE — 500445 HCHG HEMOSTAT, SURGICEL 4X8: Performed by: ORTHOPAEDIC SURGERY

## 2020-05-12 PROCEDURE — 500440 HCHG DRESSING, STERILE ROLL (KERLIX): Performed by: ORTHOPAEDIC SURGERY

## 2020-05-12 PROCEDURE — 160048 HCHG OR STATISTICAL LEVEL 1-5: Performed by: ORTHOPAEDIC SURGERY

## 2020-05-12 PROCEDURE — 502000 HCHG MISC OR IMPLANTS RC 0278: Performed by: ORTHOPAEDIC SURGERY

## 2020-05-12 PROCEDURE — 700102 HCHG RX REV CODE 250 W/ 637 OVERRIDE(OP): Performed by: ORTHOPAEDIC SURGERY

## 2020-05-12 PROCEDURE — 95861 NEEDLE EMG 2 EXTREMITIES: CPT | Performed by: ORTHOPAEDIC SURGERY

## 2020-05-12 PROCEDURE — 700102 HCHG RX REV CODE 250 W/ 637 OVERRIDE(OP): Performed by: PHYSICIAN ASSISTANT

## 2020-05-12 PROCEDURE — 0SG0071 FUSION OF LUMBAR VERTEBRAL JOINT WITH AUTOLOGOUS TISSUE SUBSTITUTE, POSTERIOR APPROACH, POSTERIOR COLUMN, OPEN APPROACH: ICD-10-PCS | Performed by: ORTHOPAEDIC SURGERY

## 2020-05-12 PROCEDURE — 110371 HCHG SHELL REV 272: Performed by: ORTHOPAEDIC SURGERY

## 2020-05-12 PROCEDURE — 160031 HCHG SURGERY MINUTES - 1ST 30 MINS LEVEL 5: Performed by: ORTHOPAEDIC SURGERY

## 2020-05-12 PROCEDURE — 110454 HCHG SHELL REV 250: Performed by: ORTHOPAEDIC SURGERY

## 2020-05-12 PROCEDURE — A9270 NON-COVERED ITEM OR SERVICE: HCPCS | Performed by: ANESTHESIOLOGY

## 2020-05-12 PROCEDURE — 86850 RBC ANTIBODY SCREEN: CPT

## 2020-05-12 PROCEDURE — 700112 HCHG RX REV CODE 229: Performed by: PHYSICIAN ASSISTANT

## 2020-05-12 PROCEDURE — 85018 HEMOGLOBIN: CPT

## 2020-05-12 PROCEDURE — 700101 HCHG RX REV CODE 250: Performed by: PHYSICIAN ASSISTANT

## 2020-05-12 PROCEDURE — A6222 GAUZE <=16 IN NO W/SAL W/O B: HCPCS | Performed by: ORTHOPAEDIC SURGERY

## 2020-05-12 PROCEDURE — 86900 BLOOD TYPING SEROLOGIC ABO: CPT

## 2020-05-12 PROCEDURE — 770001 HCHG ROOM/CARE - MED/SURG/GYN PRIV*

## 2020-05-12 PROCEDURE — 700111 HCHG RX REV CODE 636 W/ 250 OVERRIDE (IP): Performed by: PHYSICIAN ASSISTANT

## 2020-05-12 PROCEDURE — 501838 HCHG SUTURE GENERAL: Performed by: ORTHOPAEDIC SURGERY

## 2020-05-12 PROCEDURE — 700111 HCHG RX REV CODE 636 W/ 250 OVERRIDE (IP): Performed by: ANESTHESIOLOGY

## 2020-05-12 PROCEDURE — 95925 SOMATOSENSORY TESTING: CPT | Performed by: ORTHOPAEDIC SURGERY

## 2020-05-12 PROCEDURE — A4314 CATH W/DRAINAGE 2-WAY LATEX: HCPCS | Performed by: ORTHOPAEDIC SURGERY

## 2020-05-12 PROCEDURE — 86901 BLOOD TYPING SEROLOGIC RH(D): CPT

## 2020-05-12 PROCEDURE — 0QB30ZZ EXCISION OF LEFT PELVIC BONE, OPEN APPROACH: ICD-10-PCS | Performed by: ORTHOPAEDIC SURGERY

## 2020-05-12 PROCEDURE — 700101 HCHG RX REV CODE 250: Performed by: ANESTHESIOLOGY

## 2020-05-12 PROCEDURE — 500367 HCHG DRAIN KIT, HEMOVAC: Performed by: ORTHOPAEDIC SURGERY

## 2020-05-12 PROCEDURE — 700111 HCHG RX REV CODE 636 W/ 250 OVERRIDE (IP)

## 2020-05-12 PROCEDURE — 500331 HCHG COTTONOID, SURG PATTIE: Performed by: ORTHOPAEDIC SURGERY

## 2020-05-12 PROCEDURE — 502240 HCHG MISC OR SUPPLY RC 0272: Performed by: ORTHOPAEDIC SURGERY

## 2020-05-12 PROCEDURE — 160002 HCHG RECOVERY MINUTES (STAT): Performed by: ORTHOPAEDIC SURGERY

## 2020-05-12 PROCEDURE — 700101 HCHG RX REV CODE 250

## 2020-05-12 PROCEDURE — 700102 HCHG RX REV CODE 250 W/ 637 OVERRIDE(OP): Performed by: ANESTHESIOLOGY

## 2020-05-12 PROCEDURE — 501445 HCHG STAPLER, SKIN DISP: Performed by: ORTHOPAEDIC SURGERY

## 2020-05-12 PROCEDURE — 160042 HCHG SURGERY MINUTES - EA ADDL 1 MIN LEVEL 5: Performed by: ORTHOPAEDIC SURGERY

## 2020-05-12 PROCEDURE — A9270 NON-COVERED ITEM OR SERVICE: HCPCS | Performed by: ORTHOPAEDIC SURGERY

## 2020-05-12 PROCEDURE — 4A11X4G MONITORING OF PERIPHERAL NERVOUS ELECTRICAL ACTIVITY, INTRAOPERATIVE, EXTERNAL APPROACH: ICD-10-PCS | Performed by: ORTHOPAEDIC SURGERY

## 2020-05-12 PROCEDURE — 85014 HEMATOCRIT: CPT

## 2020-05-12 PROCEDURE — A9270 NON-COVERED ITEM OR SERVICE: HCPCS | Performed by: PHYSICIAN ASSISTANT

## 2020-05-12 PROCEDURE — 700105 HCHG RX REV CODE 258

## 2020-05-12 PROCEDURE — 700105 HCHG RX REV CODE 258: Performed by: ORTHOPAEDIC SURGERY

## 2020-05-12 PROCEDURE — 700105 HCHG RX REV CODE 258: Performed by: ANESTHESIOLOGY

## 2020-05-12 PROCEDURE — 160036 HCHG PACU - EA ADDL 30 MINS PHASE I: Performed by: ORTHOPAEDIC SURGERY

## 2020-05-12 PROCEDURE — 160035 HCHG PACU - 1ST 60 MINS PHASE I: Performed by: ORTHOPAEDIC SURGERY

## 2020-05-12 PROCEDURE — 700101 HCHG RX REV CODE 250: Performed by: ORTHOPAEDIC SURGERY

## 2020-05-12 PROCEDURE — 95937 NEUROMUSCULAR JUNCTION TEST: CPT | Performed by: ORTHOPAEDIC SURGERY

## 2020-05-12 PROCEDURE — 500858 HCHG NEEDLE, KYPHOPLASTY 11GA: Performed by: ORTHOPAEDIC SURGERY

## 2020-05-12 PROCEDURE — 500885 HCHG PACK, JACKSON TABLE: Performed by: ORTHOPAEDIC SURGERY

## 2020-05-12 PROCEDURE — 36415 COLL VENOUS BLD VENIPUNCTURE: CPT

## 2020-05-12 PROCEDURE — 72100 X-RAY EXAM L-S SPINE 2/3 VWS: CPT

## 2020-05-12 PROCEDURE — 95940 IONM IN OPERATNG ROOM 15 MIN: CPT | Performed by: ORTHOPAEDIC SURGERY

## 2020-05-12 PROCEDURE — 700111 HCHG RX REV CODE 636 W/ 250 OVERRIDE (IP): Performed by: ORTHOPAEDIC SURGERY

## 2020-05-12 DEVICE — GRAFT DURAMATRIX ONLAY PLUS 1IN X 1IN OR 2.7CM X 2.75CM: Type: IMPLANTABLE DEVICE | Site: SPINE LUMBAR | Status: FUNCTIONAL

## 2020-05-12 DEVICE — IMPLANTABLE DEVICE: Type: IMPLANTABLE DEVICE | Site: SPINE LUMBAR | Status: FUNCTIONAL

## 2020-05-12 DEVICE — DURASEAL SEALANT SYSTEM 5ML - (5/BX): Type: IMPLANTABLE DEVICE | Site: BACK | Status: FUNCTIONAL

## 2020-05-12 DEVICE — BONE CHIPS 30CC FREEZE DRIED CANCELLOUS CRUSHED: Type: IMPLANTABLE DEVICE | Site: SPINE LUMBAR | Status: FUNCTIONAL

## 2020-05-12 RX ORDER — LABETALOL HYDROCHLORIDE 5 MG/ML
10 INJECTION, SOLUTION INTRAVENOUS
Status: DISCONTINUED | OUTPATIENT
Start: 2020-05-12 | End: 2020-05-15 | Stop reason: HOSPADM

## 2020-05-12 RX ORDER — SODIUM CHLORIDE, SODIUM LACTATE, POTASSIUM CHLORIDE, CALCIUM CHLORIDE 600; 310; 30; 20 MG/100ML; MG/100ML; MG/100ML; MG/100ML
INJECTION, SOLUTION INTRAVENOUS CONTINUOUS
Status: DISCONTINUED | OUTPATIENT
Start: 2020-05-12 | End: 2020-05-12 | Stop reason: HOSPADM

## 2020-05-12 RX ORDER — GENTAMICIN SULFATE 40 MG/ML
INJECTION, SOLUTION INTRAMUSCULAR; INTRAVENOUS PRN
Status: DISCONTINUED | OUTPATIENT
Start: 2020-05-12 | End: 2020-05-12 | Stop reason: SURG

## 2020-05-12 RX ORDER — ACETAMINOPHEN 500 MG
1000 TABLET ORAL EVERY 6 HOURS
Status: DISCONTINUED | OUTPATIENT
Start: 2020-05-12 | End: 2020-05-15 | Stop reason: HOSPADM

## 2020-05-12 RX ORDER — ACETAMINOPHEN 500 MG
1000 TABLET ORAL ONCE
Status: COMPLETED | OUTPATIENT
Start: 2020-05-12 | End: 2020-05-12

## 2020-05-12 RX ORDER — BISACODYL 10 MG
10 SUPPOSITORY, RECTAL RECTAL
Status: DISCONTINUED | OUTPATIENT
Start: 2020-05-12 | End: 2020-05-15 | Stop reason: HOSPADM

## 2020-05-12 RX ORDER — PROMETHAZINE HYDROCHLORIDE 25 MG/1
12.5-25 TABLET ORAL EVERY 4 HOURS PRN
Status: DISCONTINUED | OUTPATIENT
Start: 2020-05-12 | End: 2020-05-15 | Stop reason: HOSPADM

## 2020-05-12 RX ORDER — BUPIVACAINE HYDROCHLORIDE AND EPINEPHRINE 5; 5 MG/ML; UG/ML
INJECTION, SOLUTION EPIDURAL; INTRACAUDAL; PERINEURAL
Status: DISCONTINUED | OUTPATIENT
Start: 2020-05-12 | End: 2020-05-12 | Stop reason: HOSPADM

## 2020-05-12 RX ORDER — GABAPENTIN 300 MG/1
600 CAPSULE ORAL 3 TIMES DAILY
Status: DISCONTINUED | OUTPATIENT
Start: 2020-05-12 | End: 2020-05-15 | Stop reason: HOSPADM

## 2020-05-12 RX ORDER — ROCURONIUM BROMIDE 10 MG/ML
INJECTION, SOLUTION INTRAVENOUS PRN
Status: DISCONTINUED | OUTPATIENT
Start: 2020-05-12 | End: 2020-05-12 | Stop reason: SURG

## 2020-05-12 RX ORDER — GABAPENTIN 300 MG/1
600 CAPSULE ORAL 3 TIMES DAILY
Status: DISCONTINUED | OUTPATIENT
Start: 2020-05-12 | End: 2020-05-12

## 2020-05-12 RX ORDER — CELECOXIB 200 MG/1
200 CAPSULE ORAL 2 TIMES DAILY
Status: DISCONTINUED | OUTPATIENT
Start: 2020-05-12 | End: 2020-05-15 | Stop reason: HOSPADM

## 2020-05-12 RX ORDER — GABAPENTIN 300 MG/1
300 CAPSULE ORAL ONCE
Status: COMPLETED | OUTPATIENT
Start: 2020-05-12 | End: 2020-05-12

## 2020-05-12 RX ORDER — OXYCODONE HYDROCHLORIDE 10 MG/1
10 TABLET ORAL
Status: DISCONTINUED | OUTPATIENT
Start: 2020-05-12 | End: 2020-05-12

## 2020-05-12 RX ORDER — DIPHENHYDRAMINE HCL 12.5MG/5ML
6.25 LIQUID (ML) ORAL EVERY 4 HOURS PRN
Status: DISCONTINUED | OUTPATIENT
Start: 2020-05-12 | End: 2020-05-15 | Stop reason: HOSPADM

## 2020-05-12 RX ORDER — ONDANSETRON 2 MG/ML
4 INJECTION INTRAMUSCULAR; INTRAVENOUS EVERY 4 HOURS PRN
Status: DISCONTINUED | OUTPATIENT
Start: 2020-05-12 | End: 2020-05-15 | Stop reason: HOSPADM

## 2020-05-12 RX ORDER — SUCCINYLCHOLINE/SOD CL,ISO/PF 200MG/10ML
SYRINGE (ML) INTRAVENOUS PRN
Status: DISCONTINUED | OUTPATIENT
Start: 2020-05-12 | End: 2020-05-12 | Stop reason: SURG

## 2020-05-12 RX ORDER — DIAZEPAM 5 MG/1
5 TABLET ORAL EVERY 6 HOURS PRN
Status: DISCONTINUED | OUTPATIENT
Start: 2020-05-12 | End: 2020-05-15 | Stop reason: HOSPADM

## 2020-05-12 RX ORDER — HYDROMORPHONE HYDROCHLORIDE 1 MG/ML
0.2 INJECTION, SOLUTION INTRAMUSCULAR; INTRAVENOUS; SUBCUTANEOUS
Status: DISCONTINUED | OUTPATIENT
Start: 2020-05-12 | End: 2020-05-12 | Stop reason: HOSPADM

## 2020-05-12 RX ORDER — OXYCODONE HCL 5 MG/5 ML
5 SOLUTION, ORAL ORAL
Status: DISCONTINUED | OUTPATIENT
Start: 2020-05-12 | End: 2020-05-12 | Stop reason: HOSPADM

## 2020-05-12 RX ORDER — DIPHENHYDRAMINE HYDROCHLORIDE 50 MG/ML
25 INJECTION INTRAMUSCULAR; INTRAVENOUS EVERY 6 HOURS PRN
Status: DISCONTINUED | OUTPATIENT
Start: 2020-05-12 | End: 2020-05-15 | Stop reason: HOSPADM

## 2020-05-12 RX ORDER — HYDROMORPHONE HYDROCHLORIDE 1 MG/ML
0.4 INJECTION, SOLUTION INTRAMUSCULAR; INTRAVENOUS; SUBCUTANEOUS
Status: DISCONTINUED | OUTPATIENT
Start: 2020-05-12 | End: 2020-05-12 | Stop reason: HOSPADM

## 2020-05-12 RX ORDER — MORPHINE SULFATE 0.5 MG/ML
INJECTION, SOLUTION EPIDURAL; INTRATHECAL; INTRAVENOUS
Status: DISCONTINUED | OUTPATIENT
Start: 2020-05-12 | End: 2020-05-12 | Stop reason: HOSPADM

## 2020-05-12 RX ORDER — ONDANSETRON 2 MG/ML
INJECTION INTRAMUSCULAR; INTRAVENOUS PRN
Status: DISCONTINUED | OUTPATIENT
Start: 2020-05-12 | End: 2020-05-12 | Stop reason: SURG

## 2020-05-12 RX ORDER — OXYCODONE HCL 5 MG/5 ML
10 SOLUTION, ORAL ORAL
Status: DISCONTINUED | OUTPATIENT
Start: 2020-05-12 | End: 2020-05-12 | Stop reason: HOSPADM

## 2020-05-12 RX ORDER — CEFAZOLIN SODIUM 1 G/50ML
1 INJECTION, SOLUTION INTRAVENOUS EVERY 8 HOURS
Status: DISCONTINUED | OUTPATIENT
Start: 2020-05-12 | End: 2020-05-15 | Stop reason: HOSPADM

## 2020-05-12 RX ORDER — DEXAMETHASONE SODIUM PHOSPHATE 4 MG/ML
INJECTION, SOLUTION INTRA-ARTICULAR; INTRALESIONAL; INTRAMUSCULAR; INTRAVENOUS; SOFT TISSUE PRN
Status: DISCONTINUED | OUTPATIENT
Start: 2020-05-12 | End: 2020-05-12 | Stop reason: SURG

## 2020-05-12 RX ORDER — SODIUM CHLORIDE, SODIUM LACTATE, POTASSIUM CHLORIDE, CALCIUM CHLORIDE 600; 310; 30; 20 MG/100ML; MG/100ML; MG/100ML; MG/100ML
INJECTION, SOLUTION INTRAVENOUS CONTINUOUS
Status: ACTIVE | OUTPATIENT
Start: 2020-05-12 | End: 2020-05-12

## 2020-05-12 RX ORDER — LIDOCAINE HYDROCHLORIDE 40 MG/ML
SOLUTION TOPICAL PRN
Status: DISCONTINUED | OUTPATIENT
Start: 2020-05-12 | End: 2020-05-12 | Stop reason: SURG

## 2020-05-12 RX ORDER — AMOXICILLIN 250 MG
1 CAPSULE ORAL
Status: DISCONTINUED | OUTPATIENT
Start: 2020-05-12 | End: 2020-05-15 | Stop reason: HOSPADM

## 2020-05-12 RX ORDER — ENEMA 19; 7 G/133ML; G/133ML
1 ENEMA RECTAL
Status: DISCONTINUED | OUTPATIENT
Start: 2020-05-12 | End: 2020-05-15 | Stop reason: HOSPADM

## 2020-05-12 RX ORDER — CELECOXIB 200 MG/1
200 CAPSULE ORAL DAILY
COMMUNITY
Start: 2020-05-04

## 2020-05-12 RX ORDER — POLYETHYLENE GLYCOL 3350 17 G/17G
1 POWDER, FOR SOLUTION ORAL 2 TIMES DAILY PRN
Status: DISCONTINUED | OUTPATIENT
Start: 2020-05-12 | End: 2020-05-15 | Stop reason: HOSPADM

## 2020-05-12 RX ORDER — FAMOTIDINE 20 MG/1
20 TABLET, FILM COATED ORAL 2 TIMES DAILY
Status: DISCONTINUED | OUTPATIENT
Start: 2020-05-12 | End: 2020-05-14

## 2020-05-12 RX ORDER — PROMETHAZINE HYDROCHLORIDE 25 MG/1
12.5-25 SUPPOSITORY RECTAL EVERY 4 HOURS PRN
Status: DISCONTINUED | OUTPATIENT
Start: 2020-05-12 | End: 2020-05-15 | Stop reason: HOSPADM

## 2020-05-12 RX ORDER — CEFAZOLIN SODIUM 1 G/3ML
INJECTION, POWDER, FOR SOLUTION INTRAMUSCULAR; INTRAVENOUS PRN
Status: DISCONTINUED | OUTPATIENT
Start: 2020-05-12 | End: 2020-05-12 | Stop reason: SURG

## 2020-05-12 RX ORDER — CELECOXIB 200 MG/1
200 CAPSULE ORAL DAILY
Status: DISCONTINUED | OUTPATIENT
Start: 2020-05-17 | End: 2020-05-15 | Stop reason: HOSPADM

## 2020-05-12 RX ORDER — DEXTROSE MONOHYDRATE, SODIUM CHLORIDE, AND POTASSIUM CHLORIDE 50; 1.49; 4.5 G/1000ML; G/1000ML; G/1000ML
INJECTION, SOLUTION INTRAVENOUS CONTINUOUS
Status: DISCONTINUED | OUTPATIENT
Start: 2020-05-12 | End: 2020-05-15 | Stop reason: HOSPADM

## 2020-05-12 RX ORDER — LEVOTHYROXINE SODIUM 175 UG/1
175 TABLET ORAL
Status: DISCONTINUED | OUTPATIENT
Start: 2020-05-12 | End: 2020-05-15 | Stop reason: HOSPADM

## 2020-05-12 RX ORDER — VANCOMYCIN HYDROCHLORIDE 1 G/20ML
INJECTION, POWDER, LYOPHILIZED, FOR SOLUTION INTRAVENOUS
Status: COMPLETED | OUTPATIENT
Start: 2020-05-12 | End: 2020-05-12

## 2020-05-12 RX ORDER — HYDROMORPHONE HYDROCHLORIDE 1 MG/ML
0.1 INJECTION, SOLUTION INTRAMUSCULAR; INTRAVENOUS; SUBCUTANEOUS
Status: DISCONTINUED | OUTPATIENT
Start: 2020-05-12 | End: 2020-05-12 | Stop reason: HOSPADM

## 2020-05-12 RX ORDER — HALOPERIDOL 5 MG/ML
1 INJECTION INTRAMUSCULAR
Status: DISCONTINUED | OUTPATIENT
Start: 2020-05-12 | End: 2020-05-12 | Stop reason: HOSPADM

## 2020-05-12 RX ORDER — FENTANYL CITRATE 50 UG/ML
INJECTION, SOLUTION INTRAMUSCULAR; INTRAVENOUS
Status: DISCONTINUED | OUTPATIENT
Start: 2020-05-12 | End: 2020-05-12 | Stop reason: HOSPADM

## 2020-05-12 RX ORDER — PROCHLORPERAZINE EDISYLATE 5 MG/ML
5-10 INJECTION INTRAMUSCULAR; INTRAVENOUS EVERY 4 HOURS PRN
Status: DISCONTINUED | OUTPATIENT
Start: 2020-05-12 | End: 2020-05-15 | Stop reason: HOSPADM

## 2020-05-12 RX ORDER — LIDOCAINE HYDROCHLORIDE 20 MG/ML
INJECTION, SOLUTION EPIDURAL; INFILTRATION; INTRACAUDAL; PERINEURAL PRN
Status: DISCONTINUED | OUTPATIENT
Start: 2020-05-12 | End: 2020-05-12 | Stop reason: SURG

## 2020-05-12 RX ORDER — SODIUM CHLORIDE 9 MG/ML
INJECTION, SOLUTION INTRAVENOUS
Status: COMPLETED
Start: 2020-05-12 | End: 2020-05-12

## 2020-05-12 RX ORDER — IPRATROPIUM BROMIDE AND ALBUTEROL SULFATE 2.5; .5 MG/3ML; MG/3ML
3 SOLUTION RESPIRATORY (INHALATION)
Status: DISCONTINUED | OUTPATIENT
Start: 2020-05-12 | End: 2020-05-12 | Stop reason: HOSPADM

## 2020-05-12 RX ORDER — OXYCODONE HCL 10 MG/1
10 TABLET, FILM COATED, EXTENDED RELEASE ORAL ONCE
Status: COMPLETED | OUTPATIENT
Start: 2020-05-12 | End: 2020-05-12

## 2020-05-12 RX ORDER — ONDANSETRON 4 MG/1
4 TABLET, ORALLY DISINTEGRATING ORAL EVERY 4 HOURS PRN
Status: DISCONTINUED | OUTPATIENT
Start: 2020-05-12 | End: 2020-05-15 | Stop reason: HOSPADM

## 2020-05-12 RX ORDER — ONDANSETRON 2 MG/ML
4 INJECTION INTRAMUSCULAR; INTRAVENOUS
Status: DISCONTINUED | OUTPATIENT
Start: 2020-05-12 | End: 2020-05-12 | Stop reason: HOSPADM

## 2020-05-12 RX ORDER — PHENYLEPHRINE HCL IN 0.9% NACL 0.5 MG/5ML
SYRINGE (ML) INTRAVENOUS PRN
Status: DISCONTINUED | OUTPATIENT
Start: 2020-05-12 | End: 2020-05-12 | Stop reason: SURG

## 2020-05-12 RX ORDER — DIPHENHYDRAMINE HCL 25 MG
25 TABLET ORAL EVERY 6 HOURS PRN
Status: DISCONTINUED | OUTPATIENT
Start: 2020-05-12 | End: 2020-05-15 | Stop reason: HOSPADM

## 2020-05-12 RX ORDER — EZETIMIBE 10 MG/1
10 TABLET ORAL DAILY
Status: DISCONTINUED | OUTPATIENT
Start: 2020-05-12 | End: 2020-05-15 | Stop reason: HOSPADM

## 2020-05-12 RX ORDER — DOCUSATE SODIUM 100 MG/1
100 CAPSULE, LIQUID FILLED ORAL 2 TIMES DAILY
Status: DISCONTINUED | OUTPATIENT
Start: 2020-05-12 | End: 2020-05-15 | Stop reason: HOSPADM

## 2020-05-12 RX ORDER — MEPERIDINE HYDROCHLORIDE 25 MG/ML
6.25 INJECTION INTRAMUSCULAR; INTRAVENOUS; SUBCUTANEOUS
Status: DISCONTINUED | OUTPATIENT
Start: 2020-05-12 | End: 2020-05-12 | Stop reason: HOSPADM

## 2020-05-12 RX ORDER — AMOXICILLIN 250 MG
1 CAPSULE ORAL NIGHTLY
Status: DISCONTINUED | OUTPATIENT
Start: 2020-05-12 | End: 2020-05-15 | Stop reason: HOSPADM

## 2020-05-12 RX ADMIN — POTASSIUM CHLORIDE, DEXTROSE MONOHYDRATE AND SODIUM CHLORIDE: 150; 5; 450 INJECTION, SOLUTION INTRAVENOUS at 15:25

## 2020-05-12 RX ADMIN — DOCUSATE SODIUM 100 MG: 100 CAPSULE, LIQUID FILLED ORAL at 17:52

## 2020-05-12 RX ADMIN — DEXAMETHASONE SODIUM PHOSPHATE 8 MG: 4 INJECTION, SOLUTION INTRA-ARTICULAR; INTRALESIONAL; INTRAMUSCULAR; INTRAVENOUS; SOFT TISSUE at 08:04

## 2020-05-12 RX ADMIN — SENNOSIDES AND DOCUSATE SODIUM 1 TABLET: 8.6; 5 TABLET ORAL at 20:25

## 2020-05-12 RX ADMIN — SODIUM CHLORIDE 500 ML: 9 INJECTION, SOLUTION INTRAVENOUS at 16:57

## 2020-05-12 RX ADMIN — POVIDONE-IODINE 15 ML: 10 SOLUTION TOPICAL at 06:28

## 2020-05-12 RX ADMIN — PROPOFOL 180 MG: 10 INJECTION, EMULSION INTRAVENOUS at 07:45

## 2020-05-12 RX ADMIN — ACETAMINOPHEN 1000 MG: 500 TABLET, FILM COATED ORAL at 20:25

## 2020-05-12 RX ADMIN — ROCURONIUM BROMIDE 40 MG: 10 INJECTION, SOLUTION INTRAVENOUS at 07:55

## 2020-05-12 RX ADMIN — LIDOCAINE HYDROCHLORIDE 4 ML: 40 SOLUTION TOPICAL at 07:46

## 2020-05-12 RX ADMIN — CEFAZOLIN 2 G: 330 INJECTION, POWDER, FOR SOLUTION INTRAMUSCULAR; INTRAVENOUS at 08:06

## 2020-05-12 RX ADMIN — CELECOXIB 200 MG: 200 CAPSULE ORAL at 17:59

## 2020-05-12 RX ADMIN — FAMOTIDINE 20 MG: 20 TABLET ORAL at 17:53

## 2020-05-12 RX ADMIN — EZETIMIBE 10 MG: 10 TABLET ORAL at 15:18

## 2020-05-12 RX ADMIN — Medication 200 MCG: at 08:40

## 2020-05-12 RX ADMIN — ALFENTANIL HYDROCHLORIDE 0.1 MCG/KG/MIN: 500 INJECTION INTRAVENOUS at 08:10

## 2020-05-12 RX ADMIN — Medication 200 MCG: at 08:55

## 2020-05-12 RX ADMIN — GABAPENTIN 600 MG: 300 CAPSULE ORAL at 15:18

## 2020-05-12 RX ADMIN — GENTAMICIN SULFATE 160 MG: 40 INJECTION, SOLUTION INTRAMUSCULAR; INTRAVENOUS at 07:50

## 2020-05-12 RX ADMIN — Medication 100 MCG: at 08:35

## 2020-05-12 RX ADMIN — Medication: at 16:49

## 2020-05-12 RX ADMIN — FENTANYL CITRATE 50 MCG: 50 INJECTION INTRAMUSCULAR; INTRAVENOUS at 10:06

## 2020-05-12 RX ADMIN — ACETAMINOPHEN 1000 MG: 500 TABLET ORAL at 06:26

## 2020-05-12 RX ADMIN — ACETAMINOPHEN 1000 MG: 500 TABLET, FILM COATED ORAL at 15:18

## 2020-05-12 RX ADMIN — Medication 120 MG: at 07:45

## 2020-05-12 RX ADMIN — PHENYLEPHRINE HYDROCHLORIDE 50 MCG/MIN: 10 INJECTION INTRAVENOUS at 09:01

## 2020-05-12 RX ADMIN — LEVOTHYROXINE SODIUM 175 MCG: 175 TABLET ORAL at 15:38

## 2020-05-12 RX ADMIN — GABAPENTIN 600 MG: 300 CAPSULE ORAL at 20:25

## 2020-05-12 RX ADMIN — ROCURONIUM BROMIDE 10 MG: 10 INJECTION, SOLUTION INTRAVENOUS at 08:33

## 2020-05-12 RX ADMIN — POTASSIUM CHLORIDE, DEXTROSE MONOHYDRATE AND SODIUM CHLORIDE: 150; 5; 450 INJECTION, SOLUTION INTRAVENOUS at 23:49

## 2020-05-12 RX ADMIN — CEFAZOLIN SODIUM 1 G: 1 INJECTION, SOLUTION INTRAVENOUS at 15:25

## 2020-05-12 RX ADMIN — GABAPENTIN 300 MG: 300 CAPSULE ORAL at 06:27

## 2020-05-12 RX ADMIN — Medication 100 MCG: at 08:30

## 2020-05-12 RX ADMIN — Medication 100 MCG: at 08:51

## 2020-05-12 RX ADMIN — FENTANYL CITRATE 50 MCG: 50 INJECTION INTRAMUSCULAR; INTRAVENOUS at 08:19

## 2020-05-12 RX ADMIN — LIDOCAINE HYDROCHLORIDE 60 MG: 20 INJECTION, SOLUTION EPIDURAL; INFILTRATION; INTRACAUDAL at 07:45

## 2020-05-12 RX ADMIN — PROPOFOL 50 MCG/KG/MIN: 10 INJECTION, EMULSION INTRAVENOUS at 08:10

## 2020-05-12 RX ADMIN — FENTANYL CITRATE 50 MCG: 50 INJECTION INTRAMUSCULAR; INTRAVENOUS at 07:40

## 2020-05-12 RX ADMIN — OXYCODONE HYDROCHLORIDE 10 MG: 10 TABLET, FILM COATED, EXTENDED RELEASE ORAL at 06:27

## 2020-05-12 RX ADMIN — SODIUM CHLORIDE, POTASSIUM CHLORIDE, SODIUM LACTATE AND CALCIUM CHLORIDE: 600; 310; 30; 20 INJECTION, SOLUTION INTRAVENOUS at 06:39

## 2020-05-12 RX ADMIN — SODIUM CHLORIDE, POTASSIUM CHLORIDE, SODIUM LACTATE AND CALCIUM CHLORIDE: 600; 310; 30; 20 INJECTION, SOLUTION INTRAVENOUS at 10:00

## 2020-05-12 RX ADMIN — CEFAZOLIN SODIUM 1 G: 1 INJECTION, SOLUTION INTRAVENOUS at 22:23

## 2020-05-12 RX ADMIN — ONDANSETRON 4 MG: 2 INJECTION INTRAMUSCULAR; INTRAVENOUS at 10:54

## 2020-05-12 ASSESSMENT — LIFESTYLE VARIABLES
HAVE YOU EVER FELT YOU SHOULD CUT DOWN ON YOUR DRINKING: NO
HAVE PEOPLE ANNOYED YOU BY CRITICIZING YOUR DRINKING: NO
CONSUMPTION TOTAL: NEGATIVE
ON A TYPICAL DAY WHEN YOU DRINK ALCOHOL HOW MANY DRINKS DO YOU HAVE: 1
TOTAL SCORE: 0
TOTAL SCORE: 0
DOES PATIENT WANT TO STOP DRINKING: NO
EVER HAD A DRINK FIRST THING IN THE MORNING TO STEADY YOUR NERVES TO GET RID OF A HANGOVER: NO
EVER_SMOKED: NEVER
ALCOHOL_USE: YES
TOTAL SCORE: 0
EVER FELT BAD OR GUILTY ABOUT YOUR DRINKING: NO
HOW MANY TIMES IN THE PAST YEAR HAVE YOU HAD 5 OR MORE DRINKS IN A DAY: 0
AVERAGE NUMBER OF DAYS PER WEEK YOU HAVE A DRINK CONTAINING ALCOHOL: 0

## 2020-05-12 ASSESSMENT — COGNITIVE AND FUNCTIONAL STATUS - GENERAL
SUGGESTED CMS G CODE MODIFIER MOBILITY: CH
DAILY ACTIVITIY SCORE: 24
MOBILITY SCORE: 24
SUGGESTED CMS G CODE MODIFIER DAILY ACTIVITY: CH

## 2020-05-12 ASSESSMENT — PATIENT HEALTH QUESTIONNAIRE - PHQ9
SUM OF ALL RESPONSES TO PHQ9 QUESTIONS 1 AND 2: 0
1. LITTLE INTEREST OR PLEASURE IN DOING THINGS: NOT AT ALL
2. FEELING DOWN, DEPRESSED, IRRITABLE, OR HOPELESS: NOT AT ALL

## 2020-05-12 ASSESSMENT — FIBROSIS 4 INDEX: FIB4 SCORE: 0.87

## 2020-05-12 NOTE — OP REPORT
DATE OF SERVICE:  05/12/2020    PREOPERATIVE DIAGNOSES:  1.  Recurrent lumbar radiculopathy, L4 and L5, right side.  2.  Lumbar instability.  3.  Recurrent lumbar stenosis.  4.  Recurrent lumbar disk herniation.  5.  Status post durotomy and cerebrospinal fluid leak, previous surgery.  6.  Status post laminectomy, L4 and L5.    POSTOPERATIVE DIAGNOSES:  1.  Recurrent lumbar radiculopathy, L4 and L5, right side.  2.  Lumbar instability.  3.  Recurrent lumbar stenosis.  4.  Recurrent lumbar disk herniation.  5.  Status post durotomy and cerebrospinal fluid leak, previous surgery.  6.  Status post laminectomy, L4 and L5.    OPERATIVE TEAM:  SURGEON:  Ramiro Keane MD    ASSISTANT SURGEON:  Jasmyne Moses PA-C    ANESTHESIOLOGIST:  Jennifer Tran MD    ANESTHESIA:  General endotracheal.    PROCEDURES:  1.  Redo laminectomy of L4.  2.  Redo laminectomy of L5.  3.  Transpars decompression of right L4-L5.  4.  Laminectomy centrally, L4 and L5.  5.  Placement of posterior segmental fixation with Berkeley screws, L4   bilateral, L5 bilateral.  6.  Use of neuro monitoring, running and stimulated EMG, somatosensory evoked   potentials.  7.  Harvesting of left iliac crest bone graft.  8.  Intertransverse process fusion, L4-L5.    TECHNIQUE:  The patient was placed under general endotracheal anesthesia.    Neuro monitoring was established after good baselines were obtained.  He was   rotated prone on Mick OSI table.  Care was taken to pad undue pressure   points.  The back was prepped and draped in sterile fashion.  The well-healed   midline incision was noted approximately 5 inches in length.  This was marked   out.  It was reincised and extended caudad and cephalad about an inch.    Incision was carried down to the lumbodorsal fascia, which was reincised.  The   paraspinous muscles were then elevated subperiosteally from the lamina of L3,   L4, L5 and S1 bilaterally.  On the right side, scar tissue was encountered  as   expected from previous laminectomy, previous diskectomy.  Scar tissue was   dense extending down into the dura.    Careful dissection was carried out with small neuro curettes and Midas Amandeep to   thin the bone.  Kerrisons were then used to create the laminectomy on the   right side of L4 and L5.  The pars intraarticularis was taken down and a   transpars decompression was carried out.  Significant compression on the right   L4 and L5 nerve root were noted and well decompressed.  The disk space itself   was explored.  It was found to be collapsed.  The dura was adherent to the   space.  Overlying the dorsum of the right L5 nerve root at its takeoff from   the dural sac, there appeared to be old scar tissue, somewhat bluish material,   possibly consistent with old dural sutures for a durotomy.  This area was   carefully avoided.    At this time, pedicle screws were placed in the pedicles of L4 and L5 in the   following sequence.  The hole was initiated with Midas Amandeep AMA, propagated   with the Laura-Guard.  This was done under fluoroscopic control.  Each hole was   palpated, appropriate size diameter and length screw was chosen and placed.    Each screw was tested to greater than 16 milliamps green zone.  Attention was   now turned to the left side.  Once again landmarks were used after the   transverse processes were stripped and decorticated to place L4 and L5 pedicle   screws.  Once again screws all tested greater than 16 milliamps.  No   stimulation was noted during insertion.    The spinous process of L4 inferior and L5 superior were taken down to allow   placement of a crosslink.  Rods and crosslinks were placed.  All screws were   tightened to appropriate torque, all nuts to appropriate torque.  AP and   lateral fluoroscopic views showed proper placement.  Neuro monitoring remained   stable.  Transverse process, lateral masses were decorticated.  The left   iliac crest was harvested with Jamshidi technique.   This was mixed with   autogenous posterior element bone graft, cancellous allograft chips and   demineralized bone matrix to form a composite graft, which was laid over the   transverse processes.    An epidural catheter was extended 10 to 15 cm above the laminectomy of L4,   through was instilled 3 mg of Duramorph, 100 mcg of fentanyl.  Catheter was   withdrawn.  The epidural space was checked for any leaks, there were none.  It   was sealed with DuraSeal and a small Duragen patch had been placed over the   area of thinned out dura as noted.    The wound was closed over deep and superficial Hemovac drains with interrupted   #1 Vicryl for the lumbodorsal fascia, interrupted #1 Vicryl for the   subcutaneous tissues and surgical staples for the skin.  Sterile dressing was   applied.    DISPOSITION:  Postanesthesia recovery in stable condition.    COMPLICATIONS:  No complications noted.    COUNTS:  Sponge and needle counts reported to be correct.    ESTIMATED BLOOD LOSS:  100 mL.    FINDINGS:  Recurrent disk herniation 4-5 with excess scar tissue compressing   and displacing the L5 and L4 nerve roots well decompressed postoperatively.    Evidence of previous durotomy, right L5 nerve root takeoff.  No CSF leak.       ____________________________________     MD ILDA COTE / DAHLIA    DD:  05/12/2020 11:28:39  DT:  05/12/2020 13:44:40    D#:  [id number]  Job#:  [id number]

## 2020-05-12 NOTE — ANESTHESIA PREPROCEDURE EVALUATION
Relevant Problems   ANESTHESIA   (+) HEAVEN (obstructive sleep apnea)      ENDO   (+) Hypothyroid      Other   (+) Hx of tobacco use, presenting hazards to health       Physical Exam    Airway   Mallampati: II  TM distance: >3 FB  Neck ROM: full       Cardiovascular - normal exam  Rhythm: regular  Rate: normal  (-) murmur     Dental - normal exam           Pulmonary - normal exam  Breath sounds clear to auscultation     Abdominal    Neurological - normal exam                 Anesthesia Plan    ASA 2       Plan - general       Airway plan will be ETT        Induction: intravenous    Postoperative Plan: Postoperative administration of opioids is intended.    Pertinent diagnostic labs and testing reviewed    Informed Consent:    Anesthetic plan and risks discussed with patient.    Use of blood products discussed with: patient whom consented to blood products.

## 2020-05-12 NOTE — ANESTHESIA PROCEDURE NOTES
Airway  Date/Time: 5/12/2020 7:46 AM  Performed by: Jennifer Tran M.D.  Authorized by: Jennifer Tran M.D.     Location:  OR  Urgency:  Elective  Difficult Airway: No    Indications for Airway Management:  Anesthesia      Spontaneous Ventilation: absent    Sedation Level:  Deep  Preoxygenated: Yes    Patient Position:  Sniffing  Mask Difficulty Assessment:  1 - vent by mask  Final Airway Type:  Endotracheal airway  Final Endotracheal Airway:  ETT  Cuffed: Yes    Technique Used for Successful ETT Placement:  Direct laryngoscopy  Devices/Methods Used in Placement:  Intubating stylet    Insertion Site:  Oral  Blade Type:  Sumanth  Laryngoscope Blade/Videolaryngoscope Blade Size:  3  ETT Size (mm):  8.0  Measured from:  Teeth  ETT to Teeth (cm):  23  Placement Verified by: auscultation and capnometry    Cormack-Lehane Classification:  Grade I - full view of glottis  Number of Attempts at Approach:  1

## 2020-05-12 NOTE — PROGRESS NOTES
Pre op complete. Pt aware of surgical plan of care, pt has call light within reach. Will continue to update and hourly rounding in place.

## 2020-05-12 NOTE — ANESTHESIA TIME REPORT
Anesthesia Start and Stop Event Times     Date Time Event    5/12/2020 0713 Ready for Procedure     0739 Anesthesia Start     1129 Anesthesia Stop        Responsible Staff  05/12/20    Name Role Begin End    Jennifer Tran M.D. Anesth 0739 1129        Preop Diagnosis (Free Text):  Pre-op Diagnosis     LUMBAR RADICULOPATHY, STENOSIS        Preop Diagnosis (Codes):    Post op Diagnosis  Spinal stenosis of lumbar region with radiculopathy      Premium Reason  Non-Premium    Comments:

## 2020-05-12 NOTE — CARE PLAN
Problem: Safety  Goal: Will remain free from falls  Outcome: PROGRESSING AS EXPECTED  Note: Pt. Bed in lowest position, locked, upper side rails up, treaded socks in place, educated about using the call light     Problem: Infection  Goal: Will remain free from infection  Outcome: PROGRESSING AS EXPECTED  Note: Surgical site dressing CD & I, educated about signs of infection and handwashing, afebrile

## 2020-05-12 NOTE — ANESTHESIA POSTPROCEDURE EVALUATION
Patient: Frantz Parker    Procedure Summary     Date:  05/12/20 Room / Location:  [facility] OR 04 / SURGERY [facility]    Anesthesia Start:   Anesthesia Stop:      Procedures:       FUSION, SPINE, LUMBAR, PLIF - L4-5 (Back)      LAMINECTOMY, SPINE, LUMBAR, WITH DISCECTOMY - RE-DO (Back) Diagnosis:  (LUMBAR RADICULOPATHY, STENOSIS)    Surgeon:  Ramiro Keane M.D. Responsible Provider:      Anesthesia Type:  general ASA Status:  2          Final Anesthesia Type: general  Last vitals  BP   Blood Pressure: 106/74    Temp   36.4 °C (97.6 °F)    Pulse   Pulse: 85   Resp   12    SpO2   96 %      Anesthesia Post Evaluation    Patient location during evaluation: PACU  Patient participation: complete - patient participated  Level of consciousness: awake and alert    Airway patency: patent  Anesthetic complications: no  Cardiovascular status: hemodynamically stable  Respiratory status: acceptable  Hydration status: euvolemic    PONV: none           Nurse Pain Score: 0 (NPRS)

## 2020-05-13 PROCEDURE — A9270 NON-COVERED ITEM OR SERVICE: HCPCS | Performed by: ORTHOPAEDIC SURGERY

## 2020-05-13 PROCEDURE — 700111 HCHG RX REV CODE 636 W/ 250 OVERRIDE (IP): Performed by: PHYSICIAN ASSISTANT

## 2020-05-13 PROCEDURE — 700102 HCHG RX REV CODE 250 W/ 637 OVERRIDE(OP): Performed by: ORTHOPAEDIC SURGERY

## 2020-05-13 PROCEDURE — 700111 HCHG RX REV CODE 636 W/ 250 OVERRIDE (IP): Performed by: ORTHOPAEDIC SURGERY

## 2020-05-13 PROCEDURE — 700102 HCHG RX REV CODE 250 W/ 637 OVERRIDE(OP): Performed by: PHYSICIAN ASSISTANT

## 2020-05-13 PROCEDURE — 770001 HCHG ROOM/CARE - MED/SURG/GYN PRIV*

## 2020-05-13 PROCEDURE — A9270 NON-COVERED ITEM OR SERVICE: HCPCS | Performed by: PHYSICIAN ASSISTANT

## 2020-05-13 PROCEDURE — 700101 HCHG RX REV CODE 250: Performed by: PHYSICIAN ASSISTANT

## 2020-05-13 PROCEDURE — 700112 HCHG RX REV CODE 229: Performed by: PHYSICIAN ASSISTANT

## 2020-05-13 RX ADMIN — CEFAZOLIN SODIUM 1 G: 1 INJECTION, SOLUTION INTRAVENOUS at 14:49

## 2020-05-13 RX ADMIN — ACETAMINOPHEN 1000 MG: 500 TABLET, FILM COATED ORAL at 23:37

## 2020-05-13 RX ADMIN — EZETIMIBE 10 MG: 10 TABLET ORAL at 05:16

## 2020-05-13 RX ADMIN — CEFAZOLIN SODIUM 1 G: 1 INJECTION, SOLUTION INTRAVENOUS at 21:37

## 2020-05-13 RX ADMIN — LEVOTHYROXINE SODIUM 175 MCG: 175 TABLET ORAL at 05:17

## 2020-05-13 RX ADMIN — CELECOXIB 200 MG: 200 CAPSULE ORAL at 05:16

## 2020-05-13 RX ADMIN — CELECOXIB 200 MG: 200 CAPSULE ORAL at 17:55

## 2020-05-13 RX ADMIN — POTASSIUM CHLORIDE, DEXTROSE MONOHYDRATE AND SODIUM CHLORIDE: 150; 5; 450 INJECTION, SOLUTION INTRAVENOUS at 08:02

## 2020-05-13 RX ADMIN — FAMOTIDINE 20 MG: 20 TABLET ORAL at 17:55

## 2020-05-13 RX ADMIN — GABAPENTIN 600 MG: 300 CAPSULE ORAL at 21:34

## 2020-05-13 RX ADMIN — DOCUSATE SODIUM 100 MG: 100 CAPSULE, LIQUID FILLED ORAL at 17:55

## 2020-05-13 RX ADMIN — ACETAMINOPHEN 1000 MG: 500 TABLET, FILM COATED ORAL at 14:48

## 2020-05-13 RX ADMIN — POTASSIUM CHLORIDE, DEXTROSE MONOHYDRATE AND SODIUM CHLORIDE: 150; 5; 450 INJECTION, SOLUTION INTRAVENOUS at 23:37

## 2020-05-13 RX ADMIN — POTASSIUM CHLORIDE, DEXTROSE MONOHYDRATE AND SODIUM CHLORIDE: 150; 5; 450 INJECTION, SOLUTION INTRAVENOUS at 15:23

## 2020-05-13 RX ADMIN — SENNOSIDES AND DOCUSATE SODIUM 1 TABLET: 8.6; 5 TABLET ORAL at 21:34

## 2020-05-13 RX ADMIN — ACETAMINOPHEN 1000 MG: 500 TABLET, FILM COATED ORAL at 05:17

## 2020-05-13 RX ADMIN — GABAPENTIN 600 MG: 300 CAPSULE ORAL at 14:48

## 2020-05-13 RX ADMIN — GABAPENTIN 600 MG: 300 CAPSULE ORAL at 08:02

## 2020-05-13 RX ADMIN — CEFAZOLIN SODIUM 1 G: 1 INJECTION, SOLUTION INTRAVENOUS at 05:17

## 2020-05-13 RX ADMIN — DOCUSATE SODIUM 100 MG: 100 CAPSULE, LIQUID FILLED ORAL at 05:17

## 2020-05-13 RX ADMIN — ACETAMINOPHEN 1000 MG: 500 TABLET, FILM COATED ORAL at 17:55

## 2020-05-13 RX ADMIN — FAMOTIDINE 20 MG: 20 TABLET ORAL at 05:16

## 2020-05-13 NOTE — CARE PLAN
Problem: Communication  Goal: The ability to communicate needs accurately and effectively will improve  Outcome: PROGRESSING AS EXPECTED    Patient able to communicate needs to staff. Call light within reach, patient educated to call for assistance. Patient calls appropriately. Will continue to assess.      Problem: Safety  Goal: Will remain free from falls  Outcome: PROGRESSING AS EXPECTED     Patient is bedrest. Patient educated to call for assistance. Precautions in place; Call light within reach. Bed alarm in use.  Bed locked and in lowest position. Treaded socks in place. Hourly rounding. Will continue to monitor.

## 2020-05-13 NOTE — PROGRESS NOTES
Doing well, pain well controlled with PCA.  Has been up and walking today.  Plascencia catheter removed. Drains still in place, overnight volume 55ml and 60ml in each drain respectively.   Call See @ [phone] for any questions concerns please, thanks!!

## 2020-05-13 NOTE — PROGRESS NOTES
Paged out to Assistant Surgeon Jasmyne Moses PA-C about some verifications with orders.   Asked if its okay to give medications with sips of water, and okay to give celebrex.   Okay to give meds with water and give celebrex per Jasmyne Moses PA-C

## 2020-05-13 NOTE — THERAPY
Missed Therapy     Patient Name: Frantz Parker  Age:  54 y.o., Sex:  male  Medical Record #: [id number]  Today's Date: 5/13/2020    Held PT evaluation, Discussed missed therapy with nursing staff.  Patient on bed rest until 5/14 at 11:20 AM.      Pablito Quintana PT

## 2020-05-14 PROCEDURE — 700112 HCHG RX REV CODE 229: Performed by: PHYSICIAN ASSISTANT

## 2020-05-14 PROCEDURE — A9270 NON-COVERED ITEM OR SERVICE: HCPCS | Performed by: ORTHOPAEDIC SURGERY

## 2020-05-14 PROCEDURE — 700111 HCHG RX REV CODE 636 W/ 250 OVERRIDE (IP): Performed by: PHYSICIAN ASSISTANT

## 2020-05-14 PROCEDURE — 770001 HCHG ROOM/CARE - MED/SURG/GYN PRIV*

## 2020-05-14 PROCEDURE — 700111 HCHG RX REV CODE 636 W/ 250 OVERRIDE (IP): Performed by: ORTHOPAEDIC SURGERY

## 2020-05-14 PROCEDURE — A9270 NON-COVERED ITEM OR SERVICE: HCPCS | Performed by: PHYSICIAN ASSISTANT

## 2020-05-14 PROCEDURE — 700105 HCHG RX REV CODE 258

## 2020-05-14 PROCEDURE — 97161 PT EVAL LOW COMPLEX 20 MIN: CPT

## 2020-05-14 PROCEDURE — 700102 HCHG RX REV CODE 250 W/ 637 OVERRIDE(OP): Performed by: PHYSICIAN ASSISTANT

## 2020-05-14 PROCEDURE — 700101 HCHG RX REV CODE 250: Performed by: PHYSICIAN ASSISTANT

## 2020-05-14 PROCEDURE — 700102 HCHG RX REV CODE 250 W/ 637 OVERRIDE(OP): Performed by: ORTHOPAEDIC SURGERY

## 2020-05-14 RX ORDER — SODIUM CHLORIDE 9 MG/ML
INJECTION, SOLUTION INTRAVENOUS
Status: COMPLETED
Start: 2020-05-14 | End: 2020-05-14

## 2020-05-14 RX ADMIN — POTASSIUM CHLORIDE, DEXTROSE MONOHYDRATE AND SODIUM CHLORIDE: 150; 5; 450 INJECTION, SOLUTION INTRAVENOUS at 09:09

## 2020-05-14 RX ADMIN — FAMOTIDINE 20 MG: 20 TABLET ORAL at 04:51

## 2020-05-14 RX ADMIN — POTASSIUM CHLORIDE, DEXTROSE MONOHYDRATE AND SODIUM CHLORIDE: 150; 5; 450 INJECTION, SOLUTION INTRAVENOUS at 17:50

## 2020-05-14 RX ADMIN — LEVOTHYROXINE SODIUM 175 MCG: 175 TABLET ORAL at 04:51

## 2020-05-14 RX ADMIN — SODIUM CHLORIDE 500 ML: 9 INJECTION, SOLUTION INTRAVENOUS at 17:49

## 2020-05-14 RX ADMIN — DOCUSATE SODIUM 100 MG: 100 CAPSULE, LIQUID FILLED ORAL at 17:46

## 2020-05-14 RX ADMIN — GABAPENTIN 600 MG: 300 CAPSULE ORAL at 09:09

## 2020-05-14 RX ADMIN — CELECOXIB 200 MG: 200 CAPSULE ORAL at 17:45

## 2020-05-14 RX ADMIN — GABAPENTIN 600 MG: 300 CAPSULE ORAL at 20:59

## 2020-05-14 RX ADMIN — DOCUSATE SODIUM 100 MG: 100 CAPSULE, LIQUID FILLED ORAL at 04:51

## 2020-05-14 RX ADMIN — SENNOSIDES AND DOCUSATE SODIUM 1 TABLET: 8.6; 5 TABLET ORAL at 20:58

## 2020-05-14 RX ADMIN — ACETAMINOPHEN 1000 MG: 500 TABLET, FILM COATED ORAL at 04:51

## 2020-05-14 RX ADMIN — CEFAZOLIN SODIUM 1 G: 1 INJECTION, SOLUTION INTRAVENOUS at 06:08

## 2020-05-14 RX ADMIN — ACETAMINOPHEN 1000 MG: 500 TABLET, FILM COATED ORAL at 17:46

## 2020-05-14 RX ADMIN — CEFAZOLIN SODIUM 1 G: 1 INJECTION, SOLUTION INTRAVENOUS at 20:59

## 2020-05-14 RX ADMIN — Medication: at 12:53

## 2020-05-14 RX ADMIN — GABAPENTIN 600 MG: 300 CAPSULE ORAL at 15:24

## 2020-05-14 RX ADMIN — CEFAZOLIN SODIUM 1 G: 1 INJECTION, SOLUTION INTRAVENOUS at 15:17

## 2020-05-14 RX ADMIN — EZETIMIBE 10 MG: 10 TABLET ORAL at 04:51

## 2020-05-14 ASSESSMENT — GAIT ASSESSMENTS
ASSISTIVE DEVICE: FRONT WHEEL WALKER
GAIT LEVEL OF ASSIST: MINIMAL ASSIST
DISTANCE (FEET): 15
DEVIATION: BRADYKINETIC;DECREASED BASE OF SUPPORT

## 2020-05-14 ASSESSMENT — COGNITIVE AND FUNCTIONAL STATUS - GENERAL
STANDING UP FROM CHAIR USING ARMS: A LITTLE
CLIMB 3 TO 5 STEPS WITH RAILING: A LITTLE
TURNING FROM BACK TO SIDE WHILE IN FLAT BAD: A LITTLE
MOVING FROM LYING ON BACK TO SITTING ON SIDE OF FLAT BED: A LITTLE
WALKING IN HOSPITAL ROOM: A LITTLE

## 2020-05-14 NOTE — PROGRESS NOTES
2115 Pt A&Ox4, denies numbness and tingling. Bed locked and in lowest position, pt educated to call for assistance. Call light within reach. Pt bedrest per order, up to 45 degrees. Denies headache or nausea. Plascencia in place per order, draining. Dressing to back CD&I. Two hemovac drains in place draining serosanguinous fluid.

## 2020-05-14 NOTE — PROGRESS NOTES
S: pain is controlled very well, has not moved out of bed or sat up yet  O: bora LE motors sensory reflexes intact, drain minimal output of max of 60 per one drain.  A: pt progressing as expected, no PT/OT yet will do this afternoon  P: dc tomorrow, will dc drains and dressing, shower patient then new dressing, dc pca right before discharge, will monitor mobility    Call See @ [phone] for any questions or concerns please     measures

## 2020-05-14 NOTE — THERAPY
Physical Therapy   Initial Evaluation     Patient Name: Frantz Parker  Age:  54 y.o., Sex:  male  Medical Record #: [id number]  Today's Date: 5/14/2020     Precautions  Precautions: Fall Risk, Spinal / Back Precautions , TLSO (Thoracolumbosacral orthosis)  Comments: TLSO on at EOB    Subjective    Pt agreeable PT tx. Pt reports looking forward to getting OOB.     Objective       05/14/20 1440   Prior Living Situation   Prior Services None;Home-Independent   Housing / Facility 1 Story House   Steps Into Home 1   Steps In Home 0   Equipment Owned None   Lives with - Patient's Self Care Capacity Spouse   Comments Spouse will assist at discharge   Prior Level of Functional Mobility   Bed Mobility Independent   Transfer Status Independent   Ambulation Independent   Distance Ambulation (Feet) 500   Assistive Devices Used None   Stairs Independent   Cognition    Cognition / Consciousness WDL   Level of Consciousness Alert   Comments cooperative   Passive ROM Lower Body   Passive ROM Lower Body WDL   Active ROM Lower Body    Active ROM Lower Body  WDL   Strength Lower Body   Lower Body Strength  WDL   Comments B LEs grossly 4/5 t/o   Sensation Lower Body   Lower Extremity Sensation   WDL   Lower Body Muscle Tone   Lower Body Muscle Tone  WDL   Strength Upper Body   Upper Body Strength  WDL   Upper Body Muscle Tone   Upper Body Muscle Tone  WDL   Coordination Upper Body   Coordination WDL   Coordination Lower Body    Coordination Lower Body  WDL   Balance Assessment   Standing Balance (Static) Good   Standing Balance (Dynamic) Fair +   Weight Shift Standing Fair   Comments with FWW   Gait Analysis   Gait Level Of Assist Minimal Assist   Assistive Device Front Wheel Walker   Distance (Feet) 15   # of Times Distance was Traveled 2   Deviation Bradykinetic;Decreased Base Of Support   Skilled Intervention Verbal Cuing;Tactile Cuing;Sequencing;Compensatory Strategies   Bed Mobility    Supine to Sit Minimal Assist   Sit to  Supine Minimal Assist   Scooting Supervised   Rolling Supervised   Skilled Intervention Verbal Cuing;Tactile Cuing;Sequencing;Compensatory Strategies  (log roll technique)   Functional Mobility   Sit to Stand Minimal Assist   Bed, Chair, Wheelchair Transfer Minimal Assist   Toilet Transfers Minimal Assist   Transfer Method Stand Pivot   Mobility gait in room to bathroom   Skilled Intervention Verbal Cuing;Tactile Cuing;Sequencing;Compensatory Strategies   Short Term Goals    Short Term Goal # 1 Pt will be SPV for supine<>sit using log roll in 6 txs to improve bed mobility.   Short Term Goal # 2 Pt will be SPV for gait x 300' with FWW in 6 txs to improve functional mobility.   Problem List    Problems Impaired Bed Mobility;Impaired Transfers;Impaired Ambulation;Impaired Balance;Limited Knowledge of Post-Op Precautions;Decreased Activity Tolerance   Anticipated Discharge Equipment   DC Equipment Front-Wheel Walker       Assessment  Patient is 54 y.o. male s/p L4-5 laminectomy redo. Pt lives with his supportive spouse in [facility] with 1-step entry. Pt reports being fully IND PTA. Educated pt on spinal precautions and TLSO maximilian/doff and use, handouts issued. Pt required Britton today for log roll in/oob, STS transfers and gait to bathroom and BTB with FWW (15'x2). Pt demonstrates decreased activity tolerance and mild deconditioning due to bedrest and inactivity.  Anticipate pt will be able to discharge to home with spouse assist when medically cleared. Pt will need FWW.    Plan    Recommend Physical Therapy 4 times per week until therapy goals are met for the following treatments:  Bed Mobility, Equipment, Gait Training, Neuro Re-Education / Balance, Therapeutic Activities and Therapeutic Exercises    Discharge recommendations:  FWW, Anticipate that the patient will have no further physical therapy needs after discharge from the hospital.

## 2020-05-14 NOTE — CARE PLAN
Problem: Communication  Goal: The ability to communicate needs accurately and effectively will improve  Outcome: PROGRESSING AS EXPECTED    Patient able to communicate needs to staff. Call light within reach, patient educated to call for assistance. Patient calls appropriately. Will continue to assess.      Problem: Safety  Goal: Will remain free from falls  Outcome: PROGRESSING AS EXPECTED    Patient educated to call for assistance. Precautions in place; Call light within reach. Bed alarm in use.  Bed locked and in lowest position. Treaded socks in place. Hourly rounding. Will continue to monitor.      Problem: Pain Management  Goal: Pain level will decrease to patient's comfort goal  Outcome: PROGRESSING AS EXPECTED    Non pharmacologic and pharmacologic interventions in place to decrease pain and promote comfort

## 2020-05-15 VITALS
BODY MASS INDEX: 29.69 KG/M2 | SYSTOLIC BLOOD PRESSURE: 112 MMHG | WEIGHT: 223.99 LBS | RESPIRATION RATE: 16 BRPM | TEMPERATURE: 97.9 F | OXYGEN SATURATION: 95 % | DIASTOLIC BLOOD PRESSURE: 78 MMHG | HEART RATE: 98 BPM | HEIGHT: 73 IN

## 2020-05-15 PROCEDURE — 700101 HCHG RX REV CODE 250: Performed by: PHYSICIAN ASSISTANT

## 2020-05-15 PROCEDURE — A9270 NON-COVERED ITEM OR SERVICE: HCPCS | Performed by: ORTHOPAEDIC SURGERY

## 2020-05-15 PROCEDURE — 97116 GAIT TRAINING THERAPY: CPT | Mod: CQ

## 2020-05-15 PROCEDURE — 700102 HCHG RX REV CODE 250 W/ 637 OVERRIDE(OP): Performed by: PHYSICIAN ASSISTANT

## 2020-05-15 PROCEDURE — A9270 NON-COVERED ITEM OR SERVICE: HCPCS | Performed by: PHYSICIAN ASSISTANT

## 2020-05-15 PROCEDURE — 97530 THERAPEUTIC ACTIVITIES: CPT | Mod: CQ

## 2020-05-15 PROCEDURE — 700102 HCHG RX REV CODE 250 W/ 637 OVERRIDE(OP): Performed by: ORTHOPAEDIC SURGERY

## 2020-05-15 PROCEDURE — 700111 HCHG RX REV CODE 636 W/ 250 OVERRIDE (IP): Performed by: ORTHOPAEDIC SURGERY

## 2020-05-15 PROCEDURE — 700112 HCHG RX REV CODE 229: Performed by: PHYSICIAN ASSISTANT

## 2020-05-15 RX ORDER — OXYCODONE HYDROCHLORIDE 10 MG/1
10 TABLET ORAL EVERY 4 HOURS PRN
Status: DISCONTINUED | OUTPATIENT
Start: 2020-05-15 | End: 2020-05-15 | Stop reason: HOSPADM

## 2020-05-15 RX ORDER — METHOCARBAMOL 750 MG/1
750 TABLET, FILM COATED ORAL 4 TIMES DAILY PRN
Qty: 120 TAB | Refills: 1 | Status: SHIPPED | OUTPATIENT
Start: 2020-05-15

## 2020-05-15 RX ORDER — GABAPENTIN 300 MG/1
600 CAPSULE ORAL 3 TIMES DAILY
Qty: 90 CAP | Refills: 1 | Status: SHIPPED
Start: 2020-05-15 | End: 2020-07-20

## 2020-05-15 RX ADMIN — ACETAMINOPHEN 1000 MG: 500 TABLET, FILM COATED ORAL at 14:15

## 2020-05-15 RX ADMIN — GABAPENTIN 600 MG: 300 CAPSULE ORAL at 08:22

## 2020-05-15 RX ADMIN — DOCUSATE SODIUM 100 MG: 100 CAPSULE, LIQUID FILLED ORAL at 06:28

## 2020-05-15 RX ADMIN — POTASSIUM CHLORIDE, DEXTROSE MONOHYDRATE AND SODIUM CHLORIDE: 150; 5; 450 INJECTION, SOLUTION INTRAVENOUS at 01:50

## 2020-05-15 RX ADMIN — CEFAZOLIN SODIUM 1 G: 1 INJECTION, SOLUTION INTRAVENOUS at 06:31

## 2020-05-15 RX ADMIN — LEVOTHYROXINE SODIUM 175 MCG: 175 TABLET ORAL at 06:28

## 2020-05-15 RX ADMIN — MAGNESIUM HYDROXIDE 30 ML: 400 SUSPENSION ORAL at 06:27

## 2020-05-15 RX ADMIN — POTASSIUM CHLORIDE, DEXTROSE MONOHYDRATE AND SODIUM CHLORIDE: 150; 5; 450 INJECTION, SOLUTION INTRAVENOUS at 10:30

## 2020-05-15 RX ADMIN — ACETAMINOPHEN 1000 MG: 500 TABLET, FILM COATED ORAL at 06:27

## 2020-05-15 RX ADMIN — CELECOXIB 200 MG: 200 CAPSULE ORAL at 08:22

## 2020-05-15 RX ADMIN — EZETIMIBE 10 MG: 10 TABLET ORAL at 06:28

## 2020-05-15 ASSESSMENT — COGNITIVE AND FUNCTIONAL STATUS - GENERAL
MOBILITY SCORE: 18
MOVING TO AND FROM BED TO CHAIR: A LITTLE
SUGGESTED CMS G CODE MODIFIER MOBILITY: CK
MOVING FROM LYING ON BACK TO SITTING ON SIDE OF FLAT BED: A LITTLE
TURNING FROM BACK TO SIDE WHILE IN FLAT BAD: A LITTLE
STANDING UP FROM CHAIR USING ARMS: A LITTLE
WALKING IN HOSPITAL ROOM: A LITTLE
CLIMB 3 TO 5 STEPS WITH RAILING: A LITTLE

## 2020-05-15 ASSESSMENT — GAIT ASSESSMENTS
GAIT LEVEL OF ASSIST: SUPERVISED
ASSISTIVE DEVICE: FRONT WHEEL WALKER
DISTANCE (FEET): 250
DEVIATION: BRADYKINETIC;OTHER (COMMENT)

## 2020-05-15 NOTE — CARE PLAN
Problem: Safety  Goal: Will remain free from falls  Outcome: PROGRESSING AS EXPECTED  Note: Pt educated on use of call light. Call light and belongings within reach. Bed locked and in lowest position.     Problem: Pain Management  Goal: Pain level will decrease to patient's comfort goal  Outcome: PROGRESSING AS EXPECTED  Note: Patient verbalizes that current pain control regimen is effective. Pt on morphine PCA. Will continue to monitor.

## 2020-05-15 NOTE — DISCHARGE INSTRUCTIONS
Discharge Instructions    Discharged to home by car with relative. Discharged via wheelchair, hospital escort: Yes.  Special equipment needed: Walker & TLSO    Be sure to schedule a follow-up appointment with your primary care doctor or any specialists as instructed.     Discharge Plan:     After shower, change dressings as needed with provided supplies.  Dry gauze folded in half over two drain holes.  Cover gauze with transparent tegaderm dressing.  Cover entire incision with large adhesive dressing.    Ambulate often.    Continue brace.   Follow up in 2 weeks in office. Call with any questions.     Diet Plan: Discussed  Activity Level: Discussed  Confirmed Follow up Appointment: Patient to Call and Schedule Appointment  Confirmed Symptoms Management: Discussed  Medication Reconciliation Updated: Yes    I understand that a diet low in cholesterol, fat, and sodium is recommended for good health. Unless I have been given specific instructions below for another diet, I accept this instruction as my diet prescription.   Other diet: Regular    Special Instructions: None    · Is patient discharged on Warfarin / Coumadin?   No     Depression / Suicide Risk    As you are discharged from this [facility], it is important to learn how to keep safe from harming yourself.    Recognize the warning signs:  · Abrupt changes in personality, positive or negative- including increase in energy   · Giving away possessions  · Change in eating patterns- significant weight changes-  positive or negative  · Change in sleeping patterns- unable to sleep or sleeping all the time   · Unwillingness or inability to communicate  · Depression  · Unusual sadness, discouragement and loneliness  · Talk of wanting to die  · Neglect of personal appearance   · Rebelliousness- reckless behavior  · Withdrawal from people/activities they love  · Confusion- inability to concentrate     If you or a loved one observes any of these behaviors or has  concerns about self-harm, here's what you can do:  · Talk about it- your feelings and reasons for harming yourself  · Remove any means that you might use to hurt yourself (examples: pills, rope, extension cords, firearm)  · Get professional help from the community (Mental Health, Substance Abuse, psychological counseling)  · Do not be alone:Call your Safe Contact- someone whom you trust who will be there for you.  · Call your local CRISIS HOTLINE 650-3469 or 673-408-4025  · Call your local Children's Mobile Crisis Response Team [place] (118) 606-2026 or www.i.Sec  · Call the toll free National Suicide Prevention Hotlines   · National Suicide Prevention Lifeline 013-084-UNHQ (3632)  · National Hope Line Network 800-SUICIDE (134-6013)

## 2020-05-15 NOTE — THERAPY
Physical Therapy   Daily Treatment     Patient Name: Frantz Parker  Age:  54 y.o., Sex:  male  Medical Record #: [id number]  Today's Date: 5/15/2020     Precautions  Precautions: Fall Risk, Spinal / Back Precautions , TLSO (Thoracolumbosacral orthosis)  Comments: TLSO EOB    Subjective    Pt eager to participate. Pt asking about spinal precautions and how long he will have them.     Objective       05/15/20 0819   Precautions   Precautions Fall Risk;Spinal / Back Precautions ;TLSO (Thoracolumbosacral orthosis)   Comments TLSO EOB   Gait Analysis   Gait Level Of Assist Supervised   Assistive Device Front Wheel Walker   Distance (Feet) 250   # of Times Distance was Traveled 1   Deviation Bradykinetic;Other (Comment)  (Forward flexed)   Bed Mobility    Supine to Sit Supervised   Sit to Supine   (NT up in chair)   Scooting Supervised   Rolling Supervised   Functional Mobility   Sit to Stand Supervised   Bed, Chair, Wheelchair Transfer Supervised   Short Term Goals    Short Term Goal # 1 Pt will be SPV for supine<>sit using log roll in 6 txs to improve bed mobility.   Goal Outcome # 1 Goal met   Short Term Goal # 2 Pt will be SPV for gait x 300' with FWW in 6 txs to improve functional mobility.   Goal Outcome # 2 Progressing as expected   Anticipated Discharge Equipment   DC Equipment Front-Wheel Walker       Assessment    Pt progressing well w/ therapy. Pt demonstrating improved strength and tolerance to mobility today. He states minimal pain at the incision site but states it isn't hindering his mobility. Pt unable to recall all spinal precautions however was able to reference handout w/ them. Pt able to perform all mobility at a SPV level. He did require Britton for donning his TLSO but states his wife can assist and he thinks it'll be easier w/out all his lines. Pt w/ a steady gait but requiring cues for posture. Pt has progressed to a level in which his rehab can be managed by outpatient PT once cleared by  MD.    Plan    Continue current treatment plan.    Discharge recommendations:  Recommend outpatient physical therapy services to address higher level deficits.

## 2020-05-15 NOTE — PROGRESS NOTES
Received report and assumed pt care.  A&O x4.  Bed alarm and treaded socks on.  Call light and personal belongings within reach.  Bed locked and at lowest position.  WILSON.  VSS.  PCA in use.

## 2020-05-15 NOTE — CARE PLAN

## 2020-05-16 NOTE — DISCHARGE SUMMARY
DATE OF ADMISSION:  5/12/2020    DATE OF DISCHARGE:  5/15/2020    DISCHARGE DIAGNOSES:  Status post revision or redo laminectomy L4, L5,   transpar decompression on the right side L4-L5, laminectomy centrally and   foraminotomies L4-L5, placement of posterior segmental fixation of Brooke   screws L4 to L5 bilaterally, using neuro monitoring, harvesting left-sided   iliac bone graft, intertransverse process fusion, L4-L5.  This is due to   recurrent lumbar radiculopathy status post L4-L5 on the right side, lumbar   instability, recurrent lumbar stenosis, disk herniation,  herniated nucleus   pulposus, and status post durotomy and cerebrospinal fluid leak from the   previous operation, status post laminectomy L4 and L5.    DISCHARGE MEDICATIONS:  Includes:  1.  Oxycodone 10 mg one p.o. every 4-6 hours p.r.n. for postoperative pain.  2.  Robaxin 750 mg 4 times a day p.r.n. for postoperative muscle spasms.  3.  Continue his Tylenol and gabapentin per the [place] protocol by Dr. Keane.    FOLLOWUP:  Follow up in approximately 2 weeks from date of operation at Dr. Ramiro Keaen's office, 100-5410 status physical exam and history and x-rays   status post lumbar revision, decompression and posterior fusion L4-L5.    HISTORY OF PRESENT ILLNESS AND HOSPITAL COURSE:  The patient is a 54-year-old   gentleman who failed all conservative treatment.  Therefore, it was   recommended for the operation stated above on the date above.  Preoperatively,   intraoperatively, and postoperatively, he has had no complications.  He has   passed all physical therapy, occupational therapy, and nursing criteria;   therefore, will be discharged on postoperative day #3.  We will follow up with   the patient in 2 weeks from date of operation at Dr. Ramiro Keane's   office.       ____________________________________     LG Hagen / DAHLIA    DD:  05/15/2020 12:57:34  DT:  05/16/2020 07:29:51    D#:  [id number]  Job#:   [id number]

## 2020-07-20 ENCOUNTER — OFFICE VISIT (OUTPATIENT)
Dept: SLEEP MEDICINE | Facility: MEDICAL CENTER | Age: 55
End: 2020-07-20
Payer: COMMERCIAL

## 2020-07-20 VITALS
RESPIRATION RATE: 16 BRPM | HEART RATE: 114 BPM | SYSTOLIC BLOOD PRESSURE: 118 MMHG | OXYGEN SATURATION: 95 % | DIASTOLIC BLOOD PRESSURE: 64 MMHG | HEIGHT: 73 IN | BODY MASS INDEX: 30.09 KG/M2 | WEIGHT: 227 LBS

## 2020-07-20 DIAGNOSIS — Z87.891 FORMER SMOKER: ICD-10-CM

## 2020-07-20 DIAGNOSIS — G47.33 OSA (OBSTRUCTIVE SLEEP APNEA): ICD-10-CM

## 2020-07-20 PROCEDURE — 99213 OFFICE O/P EST LOW 20 MIN: CPT | Performed by: NURSE PRACTITIONER

## 2020-07-20 ASSESSMENT — FIBROSIS 4 INDEX: FIB4 SCORE: 0.87

## 2020-07-20 NOTE — PROGRESS NOTES
Chief Complaint   Patient presents with   • Apnea     Last Seen 11/29/18        HPI:  Frantz Parker is a 54 y.o. year old male here today for follow-up on HEAVEN.  Last OV 11/29/18 with Silver GASCA.     Remains on BiPAP 16/12 cm nightly.  His current device is 2 years old.  Compliance report 4/29/2020 5/28/2020 indicates 18 days of use, average nightly use 5 hours 44 minutes, significant mask leak of 2 hours with an average of 4.1/h.  Reviewed by the patient.  He notes his mask to be old and hurting the skin on his face/mouth. He notes difficulty having mask fit/obtaining supplies from DME.   He notes some return of sleep symptoms including tiredness, less sleep and waking multiple times. He denies AM headaches. He does take occasional naps. He understands benefit of therapy and would like to continue with it due to sleep symptoms.   He denies cardiac or respiratory symptoms. He denies allergies or GERD. No pedal edema. He remains in brace since back surgery. He notes being mainly at home walking in evenings.    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   He recently underwent back surgery May 2020.  He has gained 7lbs since last OV. Former smoker, quit 2009.    Sleep HX:  Former PSG in 2012 and severe sleep apnea with an AHI of 55, minimum saturation 66%.  He has been prescribed BiPAP 16/12 cm H2O. Originally diagnosed >10yrs ago.      ROS: As per HPI and otherwise negative if not stated.    Past Medical History:   Diagnosis Date   • Dental disorder     upper partials   • High cholesterol    • Hypothyroidism    • Migraine    • Obstructive sleep apnea    • Sleep apnea     CPAP   • Snoring     sleep study done   • Unspecified disorder of thyroid        Past Surgical History:   Procedure Laterality Date   • PB LAMINOTOMY,LUMBAR DISK,1 INTRSP Bilateral 5/12/2020    Procedure: LAMINECTOMY, SPINE, LUMBAR, WITH DISCECTOMY - RE-DO;  Surgeon: Ramiro  GENO Keane M.D.;  Location: SURGERY [facility];  Service: Orthopedics   • LUMBAR FUSION POSTERIOR Bilateral 2020    Procedure: FUSION, SPINE, LUMBAR, PLIF - L4-5;  Surgeon: Ramiro Keane M.D.;  Location: SURGERY [facility];  Service: Orthopedics   • INGUINAL HERNIA REPAIR Left 3/7/2018    Procedure: INGUINAL HERNIA REPAIR- RECURRENT;  Surgeon: Chi Gutiérrez M.D.;  Location: SURGERY [facility];  Service: General   • LUMBAR LAMINECTOMY DISKECTOMY  2013    Performed by Maycol Hicks M.D. at [facility]   • FORAMINOTOMY  2013    Performed by Maycol Hicks M.D. at [facility]   • INGUINAL HERNIA REPAIR BILATERAL  2013    Performed by Mp Horan M.D. at [facility]   • OTHER  2007    sinus surgery   • APPENDECTOMY CHILD  1974       Family History   Problem Relation Age of Onset   • Sleep Apnea Neg Hx        Social History     Socioeconomic History   • Marital status:      Spouse name: Not on file   • Number of children: Not on file   • Years of education: Not on file   • Highest education level: Not on file   Occupational History   • Not on file   Social Needs   • Financial resource strain: Not on file   • Food insecurity     Worry: Not on file     Inability: Not on file   • Transportation needs     Medical: Not on file     Non-medical: Not on file   Tobacco Use   • Smoking status: Former Smoker     Packs/day: 1.00     Years: 15.00     Pack years: 15.00     Types: Cigarettes     Start date: 1994     Last attempt to quit: 2009     Years since quittin.5   • Smokeless tobacco: Former User     Quit date: 2009   Substance and Sexual Activity   • Alcohol use: No     Alcohol/week: 0.0 oz   • Drug use: No   • Sexual activity: Not on file   Lifestyle   • Physical activity     Days per week: Not on file     Minutes per session: Not on file   • Stress: Not on file   Relationships   • Social connections     Talks  "on phone: Not on file     Gets together: Not on file     Attends [employer] service: Not on file     Active member of club or organization: Not on file     Attends meetings of clubs or organizations: Not on file     Relationship status: Not on file   • Intimate partner violence     Fear of current or ex partner: Not on file     Emotionally abused: Not on file     Physically abused: Not on file     Forced sexual activity: Not on file   Other Topics Concern   • Not on file   Social History Narrative   • Not on file       Allergies as of 07/20/2020   • (No Known Allergies)        Vitals:  /64 (BP Location: Left arm, Patient Position: Sitting, BP Cuff Size: Adult)   Pulse (!) 114   Resp 16   Ht 1.854 m (6' 1\")   Wt 103 kg (227 lb)   SpO2 95%     Current medications as of today   Current Outpatient Medications   Medication Sig Dispense Refill   • methocarbamol (ROBAXIN) 750 MG Tab Take 1 Tab by mouth 4 times a day as needed. 120 Tab 1   • celecoxib (CELEBREX) 200 MG Cap Take 200 mg by mouth every day.     • mupirocin (BACTROBAN) 2 % Ointment Apply 1 Each to affected area(s) 2 times a day. 5 day course     • gabapentin (NEURONTIN) 600 MG tablet Take 600 mg by mouth 3 times a day. Pt had 1200 mg night prior to surgery     • ezetimibe (ZETIA) 10 MG Tab Take 10 mg by mouth every day.     • levothyroxine (SYNTHROID) 175 MCG Tab Take 175 mcg by mouth Every morning on an empty stomach. Brand Name ONLY       No current facility-administered medications for this visit.          Physical Exam:   Gen:           Alert and oriented, No apparent distress. Mood and affect appropriate, normal interaction with examiner.  Eyes:          PERRL, EOM intact, sclere white, conjunctive moist.  Ears:          Not examined.   Hearing:     Grossly intact.  Nose:          Normal, no lesions or deformities.  Dentition:    Good dentition.  Oropharynx:   mask  Mallampati Classification: mask  Neck:        Supple, trachea midline, no " masses.  Respiratory Effort: No intercostal retractions or use of accessory muscles.   Lung Auscultation:      Clear to auscultation bilaterally; no rales, rhonchi or wheezing.  CV:            Regular rate and rhythm. No murmurs, rubs or gallops.  Abd:           Not examined.   Lymphadenopathy: Not examined.  Gait and Station: Normal.  Digits and Nails: No clubbing, cyanosis, petechiae, or nodes.   Cranial Nerves: II-XII grossly intact.  Skin:        No rashes, lesions or ulcers noted.               Ext:           No cyanosis or edema.  MSK:   BACK BRACE in place      Assessment:  1. HEAVEN (obstructive sleep apnea)     2. BMI 29.0-29.9,adult     3. Former smoker         Immunizations:    Flu:10/2019  Pneumovax 23:not due  Prevnar 13:not due    Plan:  1.  Patient sleep apnea is not well treated at this time due to his significantly old mask causing rash and down to his face; so he is unable to use the device.  Sleep symptoms have returned and patient is motivated to restart therapy with new supplies.  Mask fit office.  DME mask/supplies.  2.  Discussed sleep hygiene.  3.  Follow-up with primary care for the health concerns.  4.  Follow-up in 3 months to check compliance/symptoms, sooner if needed.    Please note that this dictation was created using voice recognition software. I have made every reasonable attempt to correct obvious errors, but it is possible there are errors of grammar and possibly content that I did not discover before finalizing the note.

## 2020-11-11 ENCOUNTER — APPOINTMENT (RX ONLY)
Dept: URBAN - METROPOLITAN AREA CLINIC 4 | Facility: CLINIC | Age: 55
Setting detail: DERMATOLOGY
End: 2020-11-11

## 2020-11-11 DIAGNOSIS — D22 MELANOCYTIC NEVI: ICD-10-CM

## 2020-11-11 DIAGNOSIS — Z71.89 OTHER SPECIFIED COUNSELING: ICD-10-CM

## 2020-11-11 DIAGNOSIS — L82.1 OTHER SEBORRHEIC KERATOSIS: ICD-10-CM

## 2020-11-11 DIAGNOSIS — L81.4 OTHER MELANIN HYPERPIGMENTATION: ICD-10-CM

## 2020-11-11 DIAGNOSIS — D18.0 HEMANGIOMA: ICD-10-CM

## 2020-11-11 PROBLEM — D22.9 MELANOCYTIC NEVI, UNSPECIFIED: Status: ACTIVE | Noted: 2020-11-11

## 2020-11-11 PROBLEM — D18.01 HEMANGIOMA OF SKIN AND SUBCUTANEOUS TISSUE: Status: ACTIVE | Noted: 2020-11-11

## 2020-11-11 PROCEDURE — ? COUNSELING

## 2020-11-11 PROCEDURE — 99213 OFFICE O/P EST LOW 20 MIN: CPT

## 2020-11-11 PROCEDURE — ? SUNSCREEN RECOMMENDATIONS

## 2020-12-01 ENCOUNTER — OFFICE VISIT (OUTPATIENT)
Dept: SLEEP MEDICINE | Facility: MEDICAL CENTER | Age: 55
End: 2020-12-01
Payer: COMMERCIAL

## 2020-12-01 VITALS
SYSTOLIC BLOOD PRESSURE: 110 MMHG | BODY MASS INDEX: 30.22 KG/M2 | OXYGEN SATURATION: 93 % | RESPIRATION RATE: 16 BRPM | DIASTOLIC BLOOD PRESSURE: 70 MMHG | WEIGHT: 228 LBS | HEIGHT: 73 IN | HEART RATE: 90 BPM

## 2020-12-01 DIAGNOSIS — G47.33 OSA (OBSTRUCTIVE SLEEP APNEA): Chronic | ICD-10-CM

## 2020-12-01 DIAGNOSIS — Z87.891 FORMER SMOKER: ICD-10-CM

## 2020-12-01 PROCEDURE — 99214 OFFICE O/P EST MOD 30 MIN: CPT | Performed by: NURSE PRACTITIONER

## 2020-12-01 ASSESSMENT — FIBROSIS 4 INDEX: FIB4 SCORE: 0.89

## 2020-12-01 NOTE — PROGRESS NOTES
Chief Complaint   Patient presents with   • Apnea     Last Seen 7/20/2020       HPI:  Frantz Parker is a 55 y.o. year old male here today for follow-up on HEAVEN.   Last office visit 7/2020    Former PSG and severe sleep apnea with an AHI of 55, minimum saturation 66%.  Currently using BiPAP 16/12 cm with bi-Flex 2.  Current device is 2 years old.  Compliance report 11/1/2020 through 11/30/2020 indicates 5 days of use, average nightly use 3 hours 59 minutes, moderate mask at 35 minutes with an overall AHI 2.4/h.  Prior compliance noted consistent nightly use of about 6 hours and 56 minutes with reduced AHI 2.0/h.  I reviewed all findings with patient. He notes his current mask to be too small and with addition of chin strap he has been able to use it again. He would like a larger mask so he use it regularly. Sleep symptoms have returned with break in therapy with increased fatigue and wanting to nap in afternoon. He is motivated to resume therapy.  He denies cardiac or respiratory symptoms.    Since last office visit patient underwent spinal surgery May 2020 with revision of laminectomy. He notes back feeling quite well and no pain now. He was able to stop gabapentin as well.     ROS: As per HPI and otherwise negative if not stated.    Past Medical History:   Diagnosis Date   • Dental disorder     upper partials   • High cholesterol    • Hypothyroidism    • Migraine    • Obstructive sleep apnea    • Sleep apnea     CPAP   • Snoring     sleep study done   • Unspecified disorder of thyroid        Past Surgical History:   Procedure Laterality Date   • PB LAMINOTOMY,LUMBAR DISK,1 INTRSP Bilateral 5/12/2020    Procedure: LAMINECTOMY, SPINE, LUMBAR, WITH DISCECTOMY - RE-DO;  Surgeon: Ramiro Keane M.D.;  Location: SURGERY [facility];  Service: Orthopedics   • LUMBAR FUSION POSTERIOR Bilateral 5/12/2020    Procedure: FUSION, SPINE, LUMBAR, PLIF - L4-5;  Surgeon: Ramiro Keane M.D.;  Location: SURGERY  [facility];  Service: Orthopedics   • INGUINAL HERNIA REPAIR Left 3/7/2018    Procedure: INGUINAL HERNIA REPAIR- RECURRENT;  Surgeon: Chi Gutiérrez M.D.;  Location: SURGERY [facility];  Service: General   • LUMBAR LAMINECTOMY DISKECTOMY  2013    Performed by Maycol Hicks M.D. at SURGERY [facility]   • FORAMINOTOMY  2013    Performed by Maycol Hicks M.D. at SURGERY [facility]   • INGUINAL HERNIA REPAIR BILATERAL  2013    Performed by Mp Horan M.D. at SURGERY [facility]   • OTHER  2007    sinus surgery   • APPENDECTOMY CHILD  1974       Family History   Problem Relation Age of Onset   • Sleep Apnea Neg Hx        Social History     Socioeconomic History   • Marital status:      Spouse name: Not on file   • Number of children: Not on file   • Years of education: Not on file   • Highest education level: Not on file   Occupational History   • Not on file   Social Needs   • Financial resource strain: Not on file   • Food insecurity     Worry: Not on file     Inability: Not on file   • Transportation needs     Medical: Not on file     Non-medical: Not on file   Tobacco Use   • Smoking status: Former Smoker     Packs/day: 1.00     Years: 15.00     Pack years: 15.00     Types: Cigarettes     Start date: 1994     Quit date: 2009     Years since quittin.9   • Smokeless tobacco: Former User     Quit date: 2009   Substance and Sexual Activity   • Alcohol use: No     Alcohol/week: 0.0 oz   • Drug use: No   • Sexual activity: Not on file   Lifestyle   • Physical activity     Days per week: Not on file     Minutes per session: Not on file   • Stress: Not on file   Relationships   • Social connections     Talks on phone: Not on file     Gets together: Not on file     Attends Shinto service: Not on file     Active member of club or organization: Not on file     Attends meetings of clubs or organizations: Not on file     Relationship status: Not  "on file   • Intimate partner violence     Fear of current or ex partner: Not on file     Emotionally abused: Not on file     Physically abused: Not on file     Forced sexual activity: Not on file   Other Topics Concern   • Not on file   Social History Narrative   • Not on file       Allergies as of 12/01/2020   • (No Known Allergies)        Vitals:  /70 (BP Location: Left arm, Patient Position: Sitting, BP Cuff Size: Adult)   Pulse 90   Resp 16   Ht 1.854 m (6' 1\")   Wt 103.4 kg (228 lb)   SpO2 93%     Current medications as of today   Current Outpatient Medications   Medication Sig Dispense Refill   • methocarbamol (ROBAXIN) 750 MG Tab Take 1 Tab by mouth 4 times a day as needed. 120 Tab 1   • celecoxib (CELEBREX) 200 MG Cap Take 200 mg by mouth every day.     • mupirocin (BACTROBAN) 2 % Ointment Apply 1 Each to affected area(s) 2 times a day. 5 day course     • ezetimibe (ZETIA) 10 MG Tab Take 10 mg by mouth every day.     • levothyroxine (SYNTHROID) 175 MCG Tab Take 175 mcg by mouth Every morning on an empty stomach. Brand Name ONLY     • gabapentin (NEURONTIN) 600 MG tablet Take 600 mg by mouth 3 times a day. Pt had 1200 mg night prior to surgery       No current facility-administered medications for this visit.          Physical Exam:   Gen:           Alert and oriented, No apparent distress. Mood and affect appropriate, normal interaction with examiner.  Eyes:          PERRL, EOM intact, sclere white, conjunctive moist. Glasses.  Ears:          Not examined.   Hearing:     Grossly intact.  Nose:          Normal, no lesions or deformities.  Dentition:    Good dentition.  Oropharynx:   mask  Mallampati Classification: mask  Neck:        Supple, trachea midline, no masses.  Respiratory Effort: No intercostal retractions or use of accessory muscles.   Lung Auscultation:      Clear to auscultation bilaterally; no rales, rhonchi or wheezing.  CV:            Regular rate and rhythm. No murmurs, rubs or " gallops.  Abd:           Not examined.   Lymphadenopathy: Not examined.  Gait and Station: Normal.  Digits and Nails: No clubbing, cyanosis, petechiae, or nodes.   Cranial Nerves: II-XII grossly intact.  Skin:        No rashes, lesions or ulcers noted.               Ext:           No cyanosis or edema.      Assessment:  1. HEAVEN (obstructive sleep apnea)     2. BMI 30.0-30.9,adult  Height And Weight   3. Former smoker         Immunizations:    Flu:recommend  Pneumovax 23:not due  Prevnar 13:not due    Plan:  1. Patient continues to benefit from PAP therapy but having current mask issues. Orders placed for larger mask/mask fit. May continue with chin strap as well. Understands the need for continued therapy and motivated to continue with it. Recommend nightly basis with 6-9hr of use for optimum benefit.  DME mask/supplies  2. Discussed sleep hygiene  3. Encouraged weight loss through diet/exercise  4. F/u in 3mos for compliance check, sooner if needed. If using therapy consistently with control of symptoms/reduced AHI, may go to annual visits.    Please note that this dictation was created using voice recognition software. I have made every reasonable attempt to correct obvious errors, but it is possible there are errors of grammar and possibly content that I did not discover before finalizing the note.

## 2021-03-02 ENCOUNTER — APPOINTMENT (OUTPATIENT)
Dept: SLEEP MEDICINE | Facility: MEDICAL CENTER | Age: 56
End: 2021-03-02
Payer: COMMERCIAL

## 2022-02-14 ENCOUNTER — APPOINTMENT (RX ONLY)
Dept: URBAN - METROPOLITAN AREA CLINIC 4 | Facility: CLINIC | Age: 57
Setting detail: DERMATOLOGY
End: 2022-02-14

## 2022-02-14 DIAGNOSIS — Z71.89 OTHER SPECIFIED COUNSELING: ICD-10-CM

## 2022-02-14 DIAGNOSIS — D18.0 HEMANGIOMA: ICD-10-CM

## 2022-02-14 DIAGNOSIS — L81.4 OTHER MELANIN HYPERPIGMENTATION: ICD-10-CM

## 2022-02-14 DIAGNOSIS — D22 MELANOCYTIC NEVI: ICD-10-CM

## 2022-02-14 DIAGNOSIS — L82.1 OTHER SEBORRHEIC KERATOSIS: ICD-10-CM

## 2022-02-14 DIAGNOSIS — L40.0 PSORIASIS VULGARIS: ICD-10-CM | Status: WELL CONTROLLED

## 2022-02-14 PROBLEM — D48.5 NEOPLASM OF UNCERTAIN BEHAVIOR OF SKIN: Status: ACTIVE | Noted: 2022-02-14

## 2022-02-14 PROBLEM — D18.01 HEMANGIOMA OF SKIN AND SUBCUTANEOUS TISSUE: Status: ACTIVE | Noted: 2022-02-14

## 2022-02-14 PROBLEM — D22.5 MELANOCYTIC NEVI OF TRUNK: Status: ACTIVE | Noted: 2022-02-14

## 2022-02-14 PROBLEM — D22.39 MELANOCYTIC NEVI OF OTHER PARTS OF FACE: Status: ACTIVE | Noted: 2022-02-14

## 2022-02-14 PROBLEM — D22.71 MELANOCYTIC NEVI OF RIGHT LOWER LIMB, INCLUDING HIP: Status: ACTIVE | Noted: 2022-02-14

## 2022-02-14 PROBLEM — D22.62 MELANOCYTIC NEVI OF LEFT UPPER LIMB, INCLUDING SHOULDER: Status: ACTIVE | Noted: 2022-02-14

## 2022-02-14 PROBLEM — D22.72 MELANOCYTIC NEVI OF LEFT LOWER LIMB, INCLUDING HIP: Status: ACTIVE | Noted: 2022-02-14

## 2022-02-14 PROBLEM — D22.61 MELANOCYTIC NEVI OF RIGHT UPPER LIMB, INCLUDING SHOULDER: Status: ACTIVE | Noted: 2022-02-14

## 2022-02-14 PROCEDURE — ? SUNSCREEN TREATMENT REGIMEN

## 2022-02-14 PROCEDURE — ? COUNSELING

## 2022-02-14 PROCEDURE — ? BIOPSY BY SHAVE METHOD

## 2022-02-14 PROCEDURE — 11102 TANGNTL BX SKIN SINGLE LES: CPT

## 2022-02-14 PROCEDURE — ? ADDITIONAL NOTES

## 2022-02-14 PROCEDURE — 99213 OFFICE O/P EST LOW 20 MIN: CPT | Mod: 25

## 2022-02-14 ASSESSMENT — LOCATION SIMPLE DESCRIPTION DERM
LOCATION SIMPLE: RIGHT UPPER ARM
LOCATION SIMPLE: LEFT THIGH
LOCATION SIMPLE: UPPER BACK
LOCATION SIMPLE: RIGHT CHEEK
LOCATION SIMPLE: LEFT UPPER BACK
LOCATION SIMPLE: RIGHT THIGH
LOCATION SIMPLE: LEFT FOREHEAD
LOCATION SIMPLE: LEFT CHEEK
LOCATION SIMPLE: LEFT UPPER ARM
LOCATION SIMPLE: CHEST
LOCATION SIMPLE: ABDOMEN

## 2022-02-14 ASSESSMENT — LOCATION DETAILED DESCRIPTION DERM
LOCATION DETAILED: LEFT ANTERIOR DISTAL UPPER ARM
LOCATION DETAILED: INFERIOR THORACIC SPINE
LOCATION DETAILED: STERNUM
LOCATION DETAILED: RIGHT ANTERIOR DISTAL UPPER ARM
LOCATION DETAILED: LEFT MEDIAL UPPER BACK
LOCATION DETAILED: MIDDLE STERNUM
LOCATION DETAILED: LEFT ANTERIOR PROXIMAL THIGH
LOCATION DETAILED: RIGHT CENTRAL MALAR CHEEK
LOCATION DETAILED: RIGHT LATERAL PROXIMAL UPPER ARM
LOCATION DETAILED: LEFT CENTRAL MALAR CHEEK
LOCATION DETAILED: RIGHT ANTERIOR PROXIMAL THIGH
LOCATION DETAILED: LOWER STERNUM
LOCATION DETAILED: LEFT INFERIOR MEDIAL FOREHEAD
LOCATION DETAILED: EPIGASTRIC SKIN
LOCATION DETAILED: SUPERIOR THORACIC SPINE
LOCATION DETAILED: RIGHT INFERIOR CENTRAL MALAR CHEEK
LOCATION DETAILED: LEFT SUPERIOR MEDIAL UPPER BACK

## 2022-02-14 ASSESSMENT — LOCATION ZONE DERM
LOCATION ZONE: LEG
LOCATION ZONE: FACE
LOCATION ZONE: TRUNK
LOCATION ZONE: ARM

## 2022-02-14 NOTE — HPI: RASH (PSORIASIS)
Do You Have A Family History Of Psoriasis?: no
How Severe Is Your Psoriasis?: mild
Is This A New Presentation, Or A Follow-Up?: Follow Up Psoriasis
Additional History: Patient has Clobetasol and Triamcinolone cream that he states he has forgotten about. He has no active plaques. No change/worsening in joint pain

## 2022-02-14 NOTE — PROCEDURE: ADDITIONAL NOTES
Render Risk Assessment In Note?: no
Detail Level: Simple
Additional Notes: Psoriasis previously diagnosed. Patients states he is well controlled on moisturizers and uses topical steroids very infrequently. He declines any refills on Rxs. No active plaques on exam today.

## 2022-05-16 ENCOUNTER — APPOINTMENT (RX ONLY)
Dept: URBAN - METROPOLITAN AREA CLINIC 4 | Facility: CLINIC | Age: 57
Setting detail: DERMATOLOGY
End: 2022-05-16

## 2022-05-16 DIAGNOSIS — L81.4 OTHER MELANIN HYPERPIGMENTATION: ICD-10-CM

## 2022-05-16 DIAGNOSIS — L82.1 OTHER SEBORRHEIC KERATOSIS: ICD-10-CM

## 2022-05-16 DIAGNOSIS — Z71.89 OTHER SPECIFIED COUNSELING: ICD-10-CM

## 2022-05-16 DIAGNOSIS — Z87.2 PERSONAL HISTORY OF DISEASES OF THE SKIN AND SUBCUTANEOUS TISSUE: ICD-10-CM | Status: STABLE

## 2022-05-16 DIAGNOSIS — D22 MELANOCYTIC NEVI: ICD-10-CM

## 2022-05-16 DIAGNOSIS — D18.0 HEMANGIOMA: ICD-10-CM

## 2022-05-16 PROBLEM — D22.61 MELANOCYTIC NEVI OF RIGHT UPPER LIMB, INCLUDING SHOULDER: Status: ACTIVE | Noted: 2022-05-16

## 2022-05-16 PROBLEM — D22.62 MELANOCYTIC NEVI OF LEFT UPPER LIMB, INCLUDING SHOULDER: Status: ACTIVE | Noted: 2022-05-16

## 2022-05-16 PROBLEM — D18.01 HEMANGIOMA OF SKIN AND SUBCUTANEOUS TISSUE: Status: ACTIVE | Noted: 2022-05-16

## 2022-05-16 PROBLEM — D22.5 MELANOCYTIC NEVI OF TRUNK: Status: ACTIVE | Noted: 2022-05-16

## 2022-05-16 PROBLEM — D22.39 MELANOCYTIC NEVI OF OTHER PARTS OF FACE: Status: ACTIVE | Noted: 2022-05-16

## 2022-05-16 PROCEDURE — 99213 OFFICE O/P EST LOW 20 MIN: CPT

## 2022-05-16 PROCEDURE — ? COUNSELING

## 2022-05-16 PROCEDURE — ? SUNSCREEN TREATMENT REGIMEN

## 2022-05-16 ASSESSMENT — LOCATION DETAILED DESCRIPTION DERM
LOCATION DETAILED: LEFT INFERIOR MEDIAL FOREHEAD
LOCATION DETAILED: RIGHT INFERIOR LATERAL MALAR CHEEK
LOCATION DETAILED: RIGHT MID-UPPER BACK
LOCATION DETAILED: RIGHT ANTECUBITAL SKIN
LOCATION DETAILED: LEFT INFERIOR CENTRAL MALAR CHEEK
LOCATION DETAILED: LOWER STERNUM
LOCATION DETAILED: STERNAL NOTCH
LOCATION DETAILED: SUPERIOR THORACIC SPINE
LOCATION DETAILED: INFERIOR MID FOREHEAD
LOCATION DETAILED: LEFT PROXIMAL DORSAL FOREARM
LOCATION DETAILED: RIGHT PROXIMAL DORSAL FOREARM
LOCATION DETAILED: LEFT PROXIMAL POSTERIOR UPPER ARM
LOCATION DETAILED: SUPERIOR MID FOREHEAD
LOCATION DETAILED: RIGHT SUPERIOR MEDIAL BUCCAL CHEEK
LOCATION DETAILED: LEFT MEDIAL UPPER BACK
LOCATION DETAILED: RIGHT LATERAL PROXIMAL UPPER ARM
LOCATION DETAILED: RIGHT DISTAL DORSAL FOREARM
LOCATION DETAILED: RIGHT VENTRAL PROXIMAL FOREARM
LOCATION DETAILED: LEFT VENTRAL PROXIMAL FOREARM
LOCATION DETAILED: RIGHT DISTAL POSTERIOR UPPER ARM

## 2022-05-16 ASSESSMENT — LOCATION SIMPLE DESCRIPTION DERM
LOCATION SIMPLE: RIGHT UPPER BACK
LOCATION SIMPLE: RIGHT FOREARM
LOCATION SIMPLE: LEFT UPPER ARM
LOCATION SIMPLE: RIGHT ELBOW
LOCATION SIMPLE: LEFT CHEEK
LOCATION SIMPLE: INFERIOR FOREHEAD
LOCATION SIMPLE: RIGHT CHEEK
LOCATION SIMPLE: SUPERIOR FOREHEAD
LOCATION SIMPLE: LEFT FOREHEAD
LOCATION SIMPLE: LEFT FOREARM
LOCATION SIMPLE: CHEST
LOCATION SIMPLE: LEFT UPPER BACK
LOCATION SIMPLE: UPPER BACK
LOCATION SIMPLE: RIGHT UPPER ARM

## 2022-05-16 ASSESSMENT — LOCATION ZONE DERM
LOCATION ZONE: FACE
LOCATION ZONE: TRUNK
LOCATION ZONE: ARM

## 2022-11-21 ENCOUNTER — APPOINTMENT (RX ONLY)
Dept: URBAN - METROPOLITAN AREA CLINIC 4 | Facility: CLINIC | Age: 57
Setting detail: DERMATOLOGY
End: 2022-11-21

## 2022-11-21 DIAGNOSIS — L82.1 OTHER SEBORRHEIC KERATOSIS: ICD-10-CM

## 2022-11-21 DIAGNOSIS — Z87.2 PERSONAL HISTORY OF DISEASES OF THE SKIN AND SUBCUTANEOUS TISSUE: ICD-10-CM

## 2022-11-21 DIAGNOSIS — D18.0 HEMANGIOMA: ICD-10-CM

## 2022-11-21 DIAGNOSIS — Z71.89 OTHER SPECIFIED COUNSELING: ICD-10-CM

## 2022-11-21 DIAGNOSIS — D22 MELANOCYTIC NEVI: ICD-10-CM

## 2022-11-21 DIAGNOSIS — L81.4 OTHER MELANIN HYPERPIGMENTATION: ICD-10-CM

## 2022-11-21 PROBLEM — D18.01 HEMANGIOMA OF SKIN AND SUBCUTANEOUS TISSUE: Status: ACTIVE | Noted: 2022-11-21

## 2022-11-21 PROBLEM — D22.5 MELANOCYTIC NEVI OF TRUNK: Status: ACTIVE | Noted: 2022-11-21

## 2022-11-21 PROBLEM — D22.61 MELANOCYTIC NEVI OF RIGHT UPPER LIMB, INCLUDING SHOULDER: Status: ACTIVE | Noted: 2022-11-21

## 2022-11-21 PROBLEM — D22.62 MELANOCYTIC NEVI OF LEFT UPPER LIMB, INCLUDING SHOULDER: Status: ACTIVE | Noted: 2022-11-21

## 2022-11-21 PROBLEM — D22.39 MELANOCYTIC NEVI OF OTHER PARTS OF FACE: Status: ACTIVE | Noted: 2022-11-21

## 2022-11-21 PROCEDURE — ? SUNSCREEN TREATMENT REGIMEN

## 2022-11-21 PROCEDURE — ? COUNSELING

## 2022-11-21 PROCEDURE — 99213 OFFICE O/P EST LOW 20 MIN: CPT

## 2022-11-21 ASSESSMENT — LOCATION DETAILED DESCRIPTION DERM
LOCATION DETAILED: LEFT INFERIOR MEDIAL FOREHEAD
LOCATION DETAILED: RIGHT LATERAL PROXIMAL UPPER ARM
LOCATION DETAILED: RIGHT DISTAL POSTERIOR UPPER ARM
LOCATION DETAILED: RIGHT VENTRAL PROXIMAL FOREARM
LOCATION DETAILED: LEFT PROXIMAL POSTERIOR UPPER ARM
LOCATION DETAILED: LEFT PROXIMAL DORSAL FOREARM
LOCATION DETAILED: INFERIOR MID FOREHEAD
LOCATION DETAILED: RIGHT SUPERIOR MEDIAL BUCCAL CHEEK
LOCATION DETAILED: LOWER STERNUM
LOCATION DETAILED: RIGHT MID-UPPER BACK
LOCATION DETAILED: RIGHT ANTECUBITAL SKIN
LOCATION DETAILED: RIGHT DISTAL DORSAL FOREARM
LOCATION DETAILED: SUPERIOR THORACIC SPINE
LOCATION DETAILED: LEFT INFERIOR CENTRAL MALAR CHEEK
LOCATION DETAILED: RIGHT INFERIOR LATERAL MALAR CHEEK
LOCATION DETAILED: STERNAL NOTCH
LOCATION DETAILED: LEFT VENTRAL PROXIMAL FOREARM
LOCATION DETAILED: LEFT MEDIAL UPPER BACK
LOCATION DETAILED: SUPERIOR MID FOREHEAD
LOCATION DETAILED: RIGHT PROXIMAL DORSAL FOREARM

## 2022-11-21 ASSESSMENT — LOCATION SIMPLE DESCRIPTION DERM
LOCATION SIMPLE: RIGHT UPPER ARM
LOCATION SIMPLE: LEFT CHEEK
LOCATION SIMPLE: SUPERIOR FOREHEAD
LOCATION SIMPLE: RIGHT ELBOW
LOCATION SIMPLE: LEFT UPPER ARM
LOCATION SIMPLE: LEFT FOREHEAD
LOCATION SIMPLE: CHEST
LOCATION SIMPLE: RIGHT CHEEK
LOCATION SIMPLE: RIGHT UPPER BACK
LOCATION SIMPLE: RIGHT FOREARM
LOCATION SIMPLE: INFERIOR FOREHEAD
LOCATION SIMPLE: UPPER BACK
LOCATION SIMPLE: LEFT UPPER BACK
LOCATION SIMPLE: LEFT FOREARM

## 2022-11-21 ASSESSMENT — LOCATION ZONE DERM
LOCATION ZONE: TRUNK
LOCATION ZONE: FACE
LOCATION ZONE: ARM

## 2022-12-23 ENCOUNTER — OFFICE VISIT (OUTPATIENT)
Dept: URGENT CARE | Facility: PHYSICIAN GROUP | Age: 57
End: 2022-12-23
Payer: COMMERCIAL

## 2022-12-23 VITALS
RESPIRATION RATE: 16 BRPM | DIASTOLIC BLOOD PRESSURE: 80 MMHG | HEART RATE: 68 BPM | BODY MASS INDEX: 29.82 KG/M2 | HEIGHT: 73 IN | TEMPERATURE: 97.7 F | OXYGEN SATURATION: 97 % | WEIGHT: 225 LBS | SYSTOLIC BLOOD PRESSURE: 122 MMHG

## 2022-12-23 DIAGNOSIS — J02.9 VIRAL PHARYNGITIS: ICD-10-CM

## 2022-12-23 DIAGNOSIS — R05.8 DRY COUGH: ICD-10-CM

## 2022-12-23 PROCEDURE — 99203 OFFICE O/P NEW LOW 30 MIN: CPT | Performed by: STUDENT IN AN ORGANIZED HEALTH CARE EDUCATION/TRAINING PROGRAM

## 2022-12-23 RX ORDER — LEVOTHYROXINE SODIUM 0.1 MG/1
100 TABLET ORAL
COMMUNITY

## 2022-12-23 RX ORDER — METHYLPREDNISOLONE 4 MG/1
TABLET ORAL
Qty: 21 TABLET | Refills: 0 | Status: SHIPPED | OUTPATIENT
Start: 2022-12-23

## 2022-12-23 RX ORDER — BENZONATATE 100 MG/1
200 CAPSULE ORAL 3 TIMES DAILY PRN
Qty: 60 CAPSULE | Refills: 0 | Status: SHIPPED | OUTPATIENT
Start: 2022-12-23

## 2022-12-23 RX ORDER — RIMEGEPANT SULFATE 75 MG/75MG
TABLET, ORALLY DISINTEGRATING ORAL
COMMUNITY
Start: 2022-12-02

## 2022-12-24 NOTE — PROGRESS NOTES
"Subjective:   Frantz Parker is a 57 y.o. male who presents for Pharyngitis (X 2 weeks, Pain from ear to throat, dry cough )      HPI:  Pleasant 49-year-old male presents urgent care for 1 day of sore throat.  He states he did have a sore throat last week but had approximately 4 days between his last sore throat and his. No recent sick contacts or recent travel.  He is able to maintain adequate intake fluids and solids.  He does report a cough for the past 2 weeks.  He states that the cough is dry.  This initially started as a URI involving his last sore throat.  He states that he has been using over-the-counter cough suppressants without any relief.  Denies fever, chills, ear pain, ear discharge, sinus pain, chest pain, palpitations, shortness of breath, nausea, vomiting, dizziness, or headache.      Medications:    levothyroxine Tabs  Nurtec Tbdp    Allergies: Patient has no known allergies.    Problem List: Frantz Parker does not have a problem list on file.    Surgical History:  No past surgical history on file.    Past Social Hx: Frantz Parker  reports that he has never smoked. He has never used smokeless tobacco. He reports that he does not drink alcohol and does not use drugs.     Past Family Hx:  Frantz Parker family history is not on file.     Problem list, medications, and allergies reviewed by myself today in Epic.     Objective:     /80   Pulse 68   Temp 36.5 °C (97.7 °F) (Temporal)   Resp 16   Ht 1.854 m (6' 1\")   Wt 102 kg (225 lb)   SpO2 97%   BMI 29.69 kg/m²     Physical Exam  Vitals reviewed.   Constitutional:       General: He is not in acute distress.     Appearance: Normal appearance.   HENT:      Head: Normocephalic.      Right Ear: Tympanic membrane, ear canal and external ear normal.      Left Ear: Tympanic membrane, ear canal and external ear normal.      Nose: Nose normal.      Mouth/Throat:      Mouth: Mucous membranes are moist.      Pharynx: Oropharynx is clear. Uvula " midline. Posterior oropharyngeal erythema present. No oropharyngeal exudate.      Tonsils: No tonsillar exudate or tonsillar abscesses. 0 on the right. 0 on the left.   Eyes:      Conjunctiva/sclera: Conjunctivae normal.      Pupils: Pupils are equal, round, and reactive to light.   Cardiovascular:      Rate and Rhythm: Normal rate and regular rhythm.      Pulses: Normal pulses.      Heart sounds: Normal heart sounds. No murmur heard.  Pulmonary:      Effort: Pulmonary effort is normal. No respiratory distress.      Breath sounds: Normal breath sounds. No stridor. No wheezing, rhonchi or rales.   Musculoskeletal:      Cervical back: Normal range of motion.   Lymphadenopathy:      Cervical: No cervical adenopathy.   Skin:     General: Skin is warm and dry.      Capillary Refill: Capillary refill takes less than 2 seconds.      Findings: No erythema, lesion or rash.   Neurological:      General: No focal deficit present.      Mental Status: He is alert and oriented to person, place, and time.       Assessment/Plan:     Diagnosis and associated orders:     1. Viral pharyngitis  POCT Rapid Strep A    methylPREDNISolone (MEDROL DOSEPAK) 4 MG Tablet Therapy Pack      2. Dry cough  methylPREDNISolone (MEDROL DOSEPAK) 4 MG Tablet Therapy Pack    benzonatate (TESSALON) 100 MG Cap         Comments/MDM:     Patient presentation physical exam findings are consistent with viral pharyngitis.  He also does have persistent cough over the past 2 weeks.  We will treat his viral pharyngitis with a Medrol Dosepak.  Discussed that the Medrol Dosepak will help with his sore throat but the main reason for this medication will be to help alleviate his cough.  He was also given Tessalon to help control his cough better at this time.  Discussed that viral pharyngitis is self-limited and should resolve over the next few days.  May use Tylenol as needed.  May use warm salt water gargles, humidifier, and nasal saline spray.  ED/return  precautions given.         Differential diagnosis, natural history, supportive care, and indications for immediate follow-up discussed.    Advised the patient to follow-up with the primary care physician for recheck, reevaluation, and consideration of further management.    Please note that this dictation was created using voice recognition software. I have made a reasonable attempt to correct obvious errors, but I expect that there are errors of grammar and possibly content that I did not discover before finalizing the note.    Electronically signed by Danny Whaley PA-C.

## 2023-02-15 ENCOUNTER — APPOINTMENT (RX ONLY)
Dept: URBAN - METROPOLITAN AREA CLINIC 6 | Facility: CLINIC | Age: 58
Setting detail: DERMATOLOGY
End: 2023-02-15

## 2023-02-15 DIAGNOSIS — D18.0 HEMANGIOMA: ICD-10-CM

## 2023-02-15 DIAGNOSIS — D22 MELANOCYTIC NEVI: ICD-10-CM

## 2023-02-15 DIAGNOSIS — L82.1 OTHER SEBORRHEIC KERATOSIS: ICD-10-CM

## 2023-02-15 DIAGNOSIS — L81.4 OTHER MELANIN HYPERPIGMENTATION: ICD-10-CM

## 2023-02-15 DIAGNOSIS — Z87.2 PERSONAL HISTORY OF DISEASES OF THE SKIN AND SUBCUTANEOUS TISSUE: ICD-10-CM

## 2023-02-15 DIAGNOSIS — L73.8 OTHER SPECIFIED FOLLICULAR DISORDERS: ICD-10-CM

## 2023-02-15 DIAGNOSIS — Z71.89 OTHER SPECIFIED COUNSELING: ICD-10-CM

## 2023-02-15 PROBLEM — D22.5 MELANOCYTIC NEVI OF TRUNK: Status: ACTIVE | Noted: 2023-02-15

## 2023-02-15 PROBLEM — D22.39 MELANOCYTIC NEVI OF OTHER PARTS OF FACE: Status: ACTIVE | Noted: 2023-02-15

## 2023-02-15 PROBLEM — D22.62 MELANOCYTIC NEVI OF LEFT UPPER LIMB, INCLUDING SHOULDER: Status: ACTIVE | Noted: 2023-02-15

## 2023-02-15 PROBLEM — D22.61 MELANOCYTIC NEVI OF RIGHT UPPER LIMB, INCLUDING SHOULDER: Status: ACTIVE | Noted: 2023-02-15

## 2023-02-15 PROBLEM — D18.01 HEMANGIOMA OF SKIN AND SUBCUTANEOUS TISSUE: Status: ACTIVE | Noted: 2023-02-15

## 2023-02-15 PROCEDURE — ? SUNSCREEN TREATMENT REGIMEN

## 2023-02-15 PROCEDURE — 99213 OFFICE O/P EST LOW 20 MIN: CPT

## 2023-02-15 PROCEDURE — ? COUNSELING

## 2023-02-15 ASSESSMENT — LOCATION DETAILED DESCRIPTION DERM
LOCATION DETAILED: RIGHT VENTRAL PROXIMAL FOREARM
LOCATION DETAILED: LOWER STERNUM
LOCATION DETAILED: RIGHT PROXIMAL DORSAL FOREARM
LOCATION DETAILED: RIGHT DISTAL POSTERIOR UPPER ARM
LOCATION DETAILED: LEFT MEDIAL UPPER BACK
LOCATION DETAILED: RIGHT DISTAL DORSAL FOREARM
LOCATION DETAILED: SUPERIOR THORACIC SPINE
LOCATION DETAILED: RIGHT SUPERIOR MEDIAL BUCCAL CHEEK
LOCATION DETAILED: LEFT VENTRAL PROXIMAL FOREARM
LOCATION DETAILED: LEFT CENTRAL MALAR CHEEK
LOCATION DETAILED: LEFT PROXIMAL DORSAL FOREARM
LOCATION DETAILED: SUPERIOR MID FOREHEAD
LOCATION DETAILED: LEFT INFERIOR MEDIAL FOREHEAD
LOCATION DETAILED: RIGHT ANTECUBITAL SKIN
LOCATION DETAILED: RIGHT INFERIOR LATERAL MALAR CHEEK
LOCATION DETAILED: RIGHT LATERAL PROXIMAL UPPER ARM
LOCATION DETAILED: RIGHT MID-UPPER BACK
LOCATION DETAILED: INFERIOR MID FOREHEAD
LOCATION DETAILED: LEFT PROXIMAL POSTERIOR UPPER ARM

## 2023-02-15 ASSESSMENT — LOCATION SIMPLE DESCRIPTION DERM
LOCATION SIMPLE: RIGHT FOREARM
LOCATION SIMPLE: LEFT UPPER BACK
LOCATION SIMPLE: SUPERIOR FOREHEAD
LOCATION SIMPLE: RIGHT ELBOW
LOCATION SIMPLE: CHEST
LOCATION SIMPLE: LEFT FOREARM
LOCATION SIMPLE: INFERIOR FOREHEAD
LOCATION SIMPLE: LEFT FOREHEAD
LOCATION SIMPLE: RIGHT UPPER BACK
LOCATION SIMPLE: RIGHT UPPER ARM
LOCATION SIMPLE: LEFT UPPER ARM
LOCATION SIMPLE: UPPER BACK
LOCATION SIMPLE: RIGHT CHEEK
LOCATION SIMPLE: LEFT CHEEK

## 2023-02-15 ASSESSMENT — LOCATION ZONE DERM
LOCATION ZONE: ARM
LOCATION ZONE: TRUNK
LOCATION ZONE: FACE

## 2023-09-13 ENCOUNTER — APPOINTMENT (RX ONLY)
Dept: URBAN - METROPOLITAN AREA CLINIC 6 | Facility: CLINIC | Age: 58
Setting detail: DERMATOLOGY
End: 2023-09-13

## 2023-09-13 DIAGNOSIS — Z87.2 PERSONAL HISTORY OF DISEASES OF THE SKIN AND SUBCUTANEOUS TISSUE: ICD-10-CM

## 2023-09-13 DIAGNOSIS — L82.1 OTHER SEBORRHEIC KERATOSIS: ICD-10-CM

## 2023-09-13 DIAGNOSIS — Z71.89 OTHER SPECIFIED COUNSELING: ICD-10-CM

## 2023-09-13 DIAGNOSIS — D22 MELANOCYTIC NEVI: ICD-10-CM

## 2023-09-13 DIAGNOSIS — L81.4 OTHER MELANIN HYPERPIGMENTATION: ICD-10-CM

## 2023-09-13 DIAGNOSIS — L73.8 OTHER SPECIFIED FOLLICULAR DISORDERS: ICD-10-CM

## 2023-09-13 DIAGNOSIS — D18.0 HEMANGIOMA: ICD-10-CM

## 2023-09-13 PROBLEM — D22.62 MELANOCYTIC NEVI OF LEFT UPPER LIMB, INCLUDING SHOULDER: Status: ACTIVE | Noted: 2023-09-13

## 2023-09-13 PROBLEM — D22.5 MELANOCYTIC NEVI OF TRUNK: Status: ACTIVE | Noted: 2023-09-13

## 2023-09-13 PROBLEM — D22.39 MELANOCYTIC NEVI OF OTHER PARTS OF FACE: Status: ACTIVE | Noted: 2023-09-13

## 2023-09-13 PROBLEM — D18.01 HEMANGIOMA OF SKIN AND SUBCUTANEOUS TISSUE: Status: ACTIVE | Noted: 2023-09-13

## 2023-09-13 PROBLEM — D22.61 MELANOCYTIC NEVI OF RIGHT UPPER LIMB, INCLUDING SHOULDER: Status: ACTIVE | Noted: 2023-09-13

## 2023-09-13 PROCEDURE — ? COUNSELING

## 2023-09-13 PROCEDURE — 99213 OFFICE O/P EST LOW 20 MIN: CPT

## 2023-09-13 PROCEDURE — ? SUNSCREEN TREATMENT REGIMEN

## 2023-09-13 ASSESSMENT — LOCATION SIMPLE DESCRIPTION DERM
LOCATION SIMPLE: CHEST
LOCATION SIMPLE: RIGHT UPPER ARM
LOCATION SIMPLE: INFERIOR FOREHEAD
LOCATION SIMPLE: LEFT FOREARM
LOCATION SIMPLE: RIGHT ELBOW
LOCATION SIMPLE: RIGHT CHEEK
LOCATION SIMPLE: LEFT CHEEK
LOCATION SIMPLE: LEFT FOREHEAD
LOCATION SIMPLE: RIGHT UPPER BACK
LOCATION SIMPLE: LEFT UPPER ARM
LOCATION SIMPLE: UPPER BACK
LOCATION SIMPLE: SUPERIOR FOREHEAD
LOCATION SIMPLE: LEFT UPPER BACK
LOCATION SIMPLE: RIGHT FOREARM

## 2023-09-13 ASSESSMENT — LOCATION DETAILED DESCRIPTION DERM
LOCATION DETAILED: RIGHT DISTAL DORSAL FOREARM
LOCATION DETAILED: LOWER STERNUM
LOCATION DETAILED: INFERIOR MID FOREHEAD
LOCATION DETAILED: LEFT INFERIOR MEDIAL FOREHEAD
LOCATION DETAILED: RIGHT VENTRAL PROXIMAL FOREARM
LOCATION DETAILED: LEFT PROXIMAL POSTERIOR UPPER ARM
LOCATION DETAILED: RIGHT LATERAL PROXIMAL UPPER ARM
LOCATION DETAILED: LEFT VENTRAL PROXIMAL FOREARM
LOCATION DETAILED: RIGHT SUPERIOR MEDIAL BUCCAL CHEEK
LOCATION DETAILED: SUPERIOR THORACIC SPINE
LOCATION DETAILED: LEFT PROXIMAL DORSAL FOREARM
LOCATION DETAILED: RIGHT PROXIMAL DORSAL FOREARM
LOCATION DETAILED: RIGHT DISTAL POSTERIOR UPPER ARM
LOCATION DETAILED: LEFT CENTRAL MALAR CHEEK
LOCATION DETAILED: RIGHT MID-UPPER BACK
LOCATION DETAILED: RIGHT ANTECUBITAL SKIN
LOCATION DETAILED: SUPERIOR MID FOREHEAD
LOCATION DETAILED: LEFT MEDIAL UPPER BACK
LOCATION DETAILED: RIGHT INFERIOR LATERAL MALAR CHEEK

## 2023-09-13 ASSESSMENT — LOCATION ZONE DERM
LOCATION ZONE: TRUNK
LOCATION ZONE: FACE
LOCATION ZONE: ARM

## 2023-10-03 ENCOUNTER — APPOINTMENT (OUTPATIENT)
Dept: RADIOLOGY | Facility: MEDICAL CENTER | Age: 58
End: 2023-10-03
Attending: NURSE PRACTITIONER
Payer: COMMERCIAL

## 2024-01-08 ENCOUNTER — APPOINTMENT (OUTPATIENT)
Dept: RADIOLOGY | Facility: MEDICAL CENTER | Age: 59
End: 2024-01-08
Attending: NURSE PRACTITIONER
Payer: COMMERCIAL

## 2024-03-18 ENCOUNTER — APPOINTMENT (RX ONLY)
Dept: URBAN - METROPOLITAN AREA CLINIC 6 | Facility: CLINIC | Age: 59
Setting detail: DERMATOLOGY
End: 2024-03-18

## 2024-03-18 DIAGNOSIS — Z71.89 OTHER SPECIFIED COUNSELING: ICD-10-CM

## 2024-03-18 DIAGNOSIS — L81.4 OTHER MELANIN HYPERPIGMENTATION: ICD-10-CM

## 2024-03-18 DIAGNOSIS — D22 MELANOCYTIC NEVI: ICD-10-CM

## 2024-03-18 DIAGNOSIS — L73.8 OTHER SPECIFIED FOLLICULAR DISORDERS: ICD-10-CM

## 2024-03-18 DIAGNOSIS — D18.0 HEMANGIOMA: ICD-10-CM

## 2024-03-18 DIAGNOSIS — Z87.2 PERSONAL HISTORY OF DISEASES OF THE SKIN AND SUBCUTANEOUS TISSUE: ICD-10-CM

## 2024-03-18 DIAGNOSIS — L82.1 OTHER SEBORRHEIC KERATOSIS: ICD-10-CM

## 2024-03-18 PROBLEM — D22.39 MELANOCYTIC NEVI OF OTHER PARTS OF FACE: Status: ACTIVE | Noted: 2024-03-18

## 2024-03-18 PROBLEM — D18.01 HEMANGIOMA OF SKIN AND SUBCUTANEOUS TISSUE: Status: ACTIVE | Noted: 2024-03-18

## 2024-03-18 PROBLEM — D22.5 MELANOCYTIC NEVI OF TRUNK: Status: ACTIVE | Noted: 2024-03-18

## 2024-03-18 PROBLEM — D22.61 MELANOCYTIC NEVI OF RIGHT UPPER LIMB, INCLUDING SHOULDER: Status: ACTIVE | Noted: 2024-03-18

## 2024-03-18 PROBLEM — D22.62 MELANOCYTIC NEVI OF LEFT UPPER LIMB, INCLUDING SHOULDER: Status: ACTIVE | Noted: 2024-03-18

## 2024-03-18 PROCEDURE — 99213 OFFICE O/P EST LOW 20 MIN: CPT

## 2024-03-18 PROCEDURE — ? COUNSELING

## 2024-03-18 PROCEDURE — ? SUNSCREEN TREATMENT REGIMEN

## 2024-03-18 ASSESSMENT — LOCATION DETAILED DESCRIPTION DERM
LOCATION DETAILED: INFERIOR MID FOREHEAD
LOCATION DETAILED: RIGHT ANTECUBITAL SKIN
LOCATION DETAILED: RIGHT PROXIMAL DORSAL FOREARM
LOCATION DETAILED: LEFT MEDIAL UPPER BACK
LOCATION DETAILED: LEFT PROXIMAL POSTERIOR UPPER ARM
LOCATION DETAILED: RIGHT DISTAL DORSAL FOREARM
LOCATION DETAILED: RIGHT MID-UPPER BACK
LOCATION DETAILED: LOWER STERNUM
LOCATION DETAILED: RIGHT DISTAL POSTERIOR UPPER ARM
LOCATION DETAILED: SUPERIOR MID FOREHEAD
LOCATION DETAILED: RIGHT LATERAL PROXIMAL UPPER ARM
LOCATION DETAILED: SUPERIOR THORACIC SPINE
LOCATION DETAILED: LEFT PROXIMAL DORSAL FOREARM
LOCATION DETAILED: LEFT INFERIOR MEDIAL FOREHEAD
LOCATION DETAILED: RIGHT SUPERIOR MEDIAL BUCCAL CHEEK
LOCATION DETAILED: LEFT VENTRAL PROXIMAL FOREARM
LOCATION DETAILED: RIGHT INFERIOR LATERAL MALAR CHEEK
LOCATION DETAILED: RIGHT VENTRAL PROXIMAL FOREARM
LOCATION DETAILED: LEFT CENTRAL MALAR CHEEK

## 2024-03-18 ASSESSMENT — LOCATION SIMPLE DESCRIPTION DERM
LOCATION SIMPLE: RIGHT ELBOW
LOCATION SIMPLE: UPPER BACK
LOCATION SIMPLE: SUPERIOR FOREHEAD
LOCATION SIMPLE: RIGHT FOREARM
LOCATION SIMPLE: INFERIOR FOREHEAD
LOCATION SIMPLE: RIGHT UPPER ARM
LOCATION SIMPLE: RIGHT UPPER BACK
LOCATION SIMPLE: LEFT FOREARM
LOCATION SIMPLE: LEFT FOREHEAD
LOCATION SIMPLE: LEFT CHEEK
LOCATION SIMPLE: RIGHT CHEEK
LOCATION SIMPLE: CHEST
LOCATION SIMPLE: LEFT UPPER ARM
LOCATION SIMPLE: LEFT UPPER BACK

## 2024-03-18 ASSESSMENT — LOCATION ZONE DERM
LOCATION ZONE: TRUNK
LOCATION ZONE: ARM
LOCATION ZONE: FACE

## 2024-06-25 ENCOUNTER — APPOINTMENT (RX ONLY)
Dept: URBAN - METROPOLITAN AREA CLINIC 6 | Facility: CLINIC | Age: 59
Setting detail: DERMATOLOGY
End: 2024-06-25

## 2024-06-25 DIAGNOSIS — L82.1 OTHER SEBORRHEIC KERATOSIS: ICD-10-CM

## 2024-06-25 PROBLEM — D48.5 NEOPLASM OF UNCERTAIN BEHAVIOR OF SKIN: Status: ACTIVE | Noted: 2024-06-25

## 2024-06-25 PROCEDURE — 11102 TANGNTL BX SKIN SINGLE LES: CPT

## 2024-06-25 PROCEDURE — ? BIOPSY BY SHAVE METHOD

## 2024-06-25 ASSESSMENT — LOCATION DETAILED DESCRIPTION DERM: LOCATION DETAILED: RIGHT PROXIMAL POSTERIOR UPPER ARM

## 2024-06-25 ASSESSMENT — LOCATION ZONE DERM: LOCATION ZONE: ARM

## 2024-06-25 ASSESSMENT — LOCATION SIMPLE DESCRIPTION DERM: LOCATION SIMPLE: RIGHT UPPER ARM

## 2024-09-16 NOTE — PROGRESS NOTES
2 RN skin check completed with NOLVIA Stewart  Devices in place SCD to BLE, 2 Hemovac, Nasal Cannula.  Skin assessed under devices :Yes.  Confirmed pressure ulcers found on :n/a.  New potential pressure ulcers noted on n/a.   Pt. On bedrest for 48 hours, will encourage and help with Q2 turns, pt. Using moisturizer   Incision site dressing the lower back is CD & I   22

## 2024-11-06 ENCOUNTER — HOSPITAL ENCOUNTER (OUTPATIENT)
Dept: RADIOLOGY | Facility: MEDICAL CENTER | Age: 59
End: 2024-11-06
Attending: NURSE PRACTITIONER
Payer: COMMERCIAL

## 2024-11-06 DIAGNOSIS — R10.11 ABDOMINAL PAIN, RUQ (RIGHT UPPER QUADRANT): ICD-10-CM

## 2024-11-06 PROCEDURE — 76705 ECHO EXAM OF ABDOMEN: CPT

## 2024-12-16 ENCOUNTER — HOSPITAL ENCOUNTER (OUTPATIENT)
Dept: RADIOLOGY | Facility: MEDICAL CENTER | Age: 59
End: 2024-12-16
Attending: INTERNAL MEDICINE
Payer: COMMERCIAL

## 2024-12-16 DIAGNOSIS — R10.11 ABDOMINAL PAIN, RIGHT UPPER QUADRANT: ICD-10-CM

## 2024-12-16 PROCEDURE — A9537 TC99M MEBROFENIN: HCPCS

## (undated) DEVICE — SPONGE PEANUT - (5/PK 50PK/CA)

## (undated) DEVICE — ARMREST CRADLE FOAM - (2PR/PK 12PR/CA)

## (undated) DEVICE — PATTIES SURG X-RAYCOTTONOID - 1 X 3 IN (200/CA)

## (undated) DEVICE — NEEDLE NON SAFETY HYPO 22 GA X 1 1/2 IN (100/BX)

## (undated) DEVICE — INTRAOP NEURO IN OR 1:1 PER 15 MIN

## (undated) DEVICE — GLOVE BIOGEL PI INDICATOR SZ 6.5 SURGICAL PF LF - (50/BX 4BX/CA)

## (undated) DEVICE — NEEDLE 11GA FOR KYPHOPLASTY

## (undated) DEVICE — SET EXTENSION WITH 2 PORTS (48EA/CA) ***PART #2C8610 IS A SUBSTITUTE*****

## (undated) DEVICE — DISSECT TOOL MIDAS REX

## (undated) DEVICE — DRAPE LAPAROTOMY T SHEET - (12EA/CA)

## (undated) DEVICE — DRESSING TRANSPARENT FILM TEGADERM 4 X 4.75" (50EA/BX)"

## (undated) DEVICE — PROTECTOR ULNA NERVE - (36PR/CA)

## (undated) DEVICE — SUCTION TIP STRAIGHT ARGYLE - 50EA/CA

## (undated) DEVICE — ELECTRODE DUAL RETURN W/ CORD - (50/PK)

## (undated) DEVICE — KIT ANESTHESIA W/CIRCUIT & 3/LT BAG W/FILTER (20EA/CA)

## (undated) DEVICE — HANDPIECE 10FT INTPLS SCT PLS IRRIGATION HAND CONTROL SET (6/PK)

## (undated) DEVICE — ELECTRODE 850 FOAM ADHESIVE - HYDROGEL RADIOTRNSPRNT (50/PK)

## (undated) DEVICE — SUTURE 0 ETHIBOND MO6 C/R - (12/BX) 8-18 INCH ETHICON

## (undated) DEVICE — GLOVE SZ 6.5 BIOGEL PI MICRO - PF LF (50PR/BX)

## (undated) DEVICE — CHLORAPREP 26 ML APPLICATOR - ORANGE TINT(25/CA)

## (undated) DEVICE — SYRINGE SAFETY 10 ML 18 GA X 1 1/2 BLUNT LL (100/BX 4BX/CA)

## (undated) DEVICE — GOWN SURGEONS LARGE - (32/CA)

## (undated) DEVICE — NEEDLE SAFETY 18 GA X 1 1/2 IN (100EA/BX)

## (undated) DEVICE — DRAPE C ARMOR (12EA/CA)

## (undated) DEVICE — PACK MINOR BASIN - (2EA/CA)

## (undated) DEVICE — SODIUM CHL IRRIGATION 0.9% 1000ML (12EA/CA)

## (undated) DEVICE — CATHETER IV 14 GA X 2 ---SURG.& SDS ONLY---(200EA/CA)

## (undated) DEVICE — COVER MAYO STAND X-LG - (22EA/CA)

## (undated) DEVICE — CELLSAVER PACK

## (undated) DEVICE — LACTATED RINGERS INJ 1000 ML - (14EA/CA 60CA/PF)

## (undated) DEVICE — SPONGE GAUZESTER 4 X 4 4PLY - (128PK/CA)

## (undated) DEVICE — DRAPE IOBAN II INCISE 23X17 - (10EA/BX 4BX/CA)

## (undated) DEVICE — KIT EVACUATER 3 SPRING PVC LF 1/8 DRAIN SIZE (10EA/CA)"

## (undated) DEVICE — BANDAGE ROLL STERILE BULKEE 4.5 IN X 4 YD (100EA/CA)

## (undated) DEVICE — SENSOR SPO2 NEO LNCS ADHESIVE (20/BX) SEE USER NOTES

## (undated) DEVICE — SUTURE 3-0 27IN PDS PLUS CLR - SH (36/BX)

## (undated) DEVICE — CONTAINER SPECIMEN BAG OR - STERILE 4 OZ W/LID (100EA/CA)

## (undated) DEVICE — SURGIFOAM (12X7) - (12EA/CA)

## (undated) DEVICE — HEMOSTAT SURG ABSORBABLE - 4 X 8 IN SURGICEL (24EA/CA)

## (undated) DEVICE — TUBING C&T SET FLYING LEADS DRAIN TUBING (10EA/BX)

## (undated) DEVICE — CANISTER SUCTION 3000ML MECHANICAL FILTER AUTO SHUTOFF MEDI-VAC NONSTERILE LF DISP  (40EA/CA)

## (undated) DEVICE — CELLSAVER STAT

## (undated) DEVICE — MIDAS LUBRICATOR DIFFUSER PACK (4EA/CA)

## (undated) DEVICE — TUBING CLEARLINK DUO-VENT - C-FLO (48EA/CA)

## (undated) DEVICE — SUTURE 2-0 VICRYL PLUS SH - 27 INCH (36/BX)

## (undated) DEVICE — TRAY CATHETER FOLEY URINE METER W/STATLOCK 350ML (10EA/CA)

## (undated) DEVICE — SET LEADWIRE 5 LEAD BEDSIDE DISPOSABLE ECG (1SET OF 5/EA)

## (undated) DEVICE — CLIP MED INTNL HRZN TI ESCP - (25/BX)

## (undated) DEVICE — DRESSING XEROFORM 1X8 - (50/BX 4BX/CA)

## (undated) DEVICE — GLOVE BIOGEL ECLIPSE PF LATEX SIZE 8.0  (50PR/BX)

## (undated) DEVICE — SUTURE 0 VICRYL PLUS CT-1 - 8 X 18 INCH (12/BX)

## (undated) DEVICE — GLOVE BIOGEL INDICATOR SZ 8.5 SURGICAL PF LTX - (50/BX 4BX/CA)

## (undated) DEVICE — SEALER BIPOLAR 2.3 AQUAMANTYS

## (undated) DEVICE — HEAD HOLDER JUNIOR/ADULT

## (undated) DEVICE — MASK ANESTHESIA ADULT  - (100/CA)

## (undated) DEVICE — SUTURE 3-0 VICRYL PLUS SH - 8X 18 INCH (12/BX)

## (undated) DEVICE — WATER IRRIGATION STERILE 1000ML (12EA/CA)

## (undated) DEVICE — SYRINGE 30 ML LL (56/BX)

## (undated) DEVICE — NEPTUNE 4 PORT MANIFOLD - (20/PK)

## (undated) DEVICE — DRAPE STRLE REG TOWEL 18X24 - (10/BX 4BX/CA)"

## (undated) DEVICE — DRAPE LARGE 3 QUARTER - (20/CA)

## (undated) DEVICE — GOWN WARMING STANDARD FLEX - (30/CA)

## (undated) DEVICE — SET EPIDURAL BURRON NON-SAFETY (12EA/CA)

## (undated) DEVICE — SUCTION INSTRUMENT YANKAUER BULBOUS TIP W/O VENT (50EA/CA)

## (undated) DEVICE — TOOL DISSECT MATCH HEAD

## (undated) DEVICE — SLEEVE, VASO, THIGH, MED

## (undated) DEVICE — SOD. CHL. INJ. 0.9% 1000 ML - (14EA/CA 60CA/PF)

## (undated) DEVICE — CLOSURE SKIN STRIP 1/2 X 4 IN - (STERI STRIP) (50/BX 4BX/CA)

## (undated) DEVICE — STAPLER SKIN DISP - (6/BX 10BX/CA) VISISTAT

## (undated) DEVICE — TIP INTPLS HFLO ML ORFC BTRY - (12/CS)  FOR SURGILAV

## (undated) DEVICE — SUTURE 1 VICRYL PLUS CTX - 8 X 18 INCH (12/BX)

## (undated) DEVICE — SUTURE 4-0 30CM STRATAFIX SPIRAL PS-2 (12EA/BX)

## (undated) DEVICE — HEADREST PRONEVIEW LARGE - (10/CA)

## (undated) DEVICE — KIT ROOM DECONTAMINATION

## (undated) DEVICE — SUTURE 0 VICRYL PLUS CT-2 - 8 X 18 INCH (12/BX)

## (undated) DEVICE — STERI STRIP COMPOUND BENZOIN - TINCTURE 0.6ML WITH APPLICATOR (40EA/BX)

## (undated) DEVICE — TUBE E-T HI-LO CUFF 8.0MM (10EA/PK)

## (undated) DEVICE — SHEET PEDIATRIC LAPAROTOMY - (10/CA)

## (undated) DEVICE — SUTURE 3-0 VICRYL PLUS - 12 X 18 INCH (12/BX)

## (undated) DEVICE — SUTURE 4-0 VICRYL PLUS FS-2 - 27 INCH (36/BX)

## (undated) DEVICE — TIP INTPLS FMCNL IRR PMP SCT - (12/PKG) FOR SURGILAV

## (undated) DEVICE — BONE MILL BM210

## (undated) DEVICE — GLOVE BIOGEL SZ 8.5 SURGICAL PF LTX - (50PR/BX 4BX/CA)

## (undated) DEVICE — SUTURE GENERAL

## (undated) DEVICE — GLOVE BIOGEL SZ 6 PF LATEX - (50EA/BX 4BX/CA)

## (undated) DEVICE — LACTATED RINGERS INJ. 500 ML - (24EA/CA)

## (undated) DEVICE — GLOVE BIOGEL SZ 8 SURGICAL PF LTX - (50PR/BX 4BX/CA)

## (undated) DEVICE — SYRINGE 10 ML CONTROL LL (25EA/BX 4BX/CA)

## (undated) DEVICE — DRAPE 36X28IN RAD CARM BND BG - (25/CA) O

## (undated) DEVICE — PACK JACKSON TABLE KIT W/OUT - HR (6EA/CA)

## (undated) DEVICE — DRAIN PENROSE STERILE 1/4 X - 18 IN  (25EA/BX)

## (undated) DEVICE — KIT SURGIFLO W/OUT THROMBIN - (6EA/CA)

## (undated) DEVICE — SUTURE 2-0 VICRYL PLUS SH - 8 X 18 INCH (12/BX)

## (undated) DEVICE — PACK NEURO - (2EA/CA)

## (undated) DEVICE — PEDIGUARD CURVED (1EA)

## (undated) DEVICE — SYRINGE SAFETY 3 ML 18 GA X 1 1/2 BLUNT LL (100/BX 8BX/CA)